# Patient Record
Sex: FEMALE | Race: BLACK OR AFRICAN AMERICAN | NOT HISPANIC OR LATINO | URBAN - METROPOLITAN AREA
[De-identification: names, ages, dates, MRNs, and addresses within clinical notes are randomized per-mention and may not be internally consistent; named-entity substitution may affect disease eponyms.]

---

## 2024-02-21 ENCOUNTER — EMERGENCY (EMERGENCY)
Facility: HOSPITAL | Age: 69
LOS: 1 days | Discharge: HOME | End: 2024-02-21
Attending: EMERGENCY MEDICINE | Admitting: EMERGENCY MEDICINE
Payer: COMMERCIAL

## 2024-02-21 VITALS
TEMPERATURE: 98 F | SYSTOLIC BLOOD PRESSURE: 151 MMHG | HEART RATE: 86 BPM | OXYGEN SATURATION: 100 % | RESPIRATION RATE: 16 BRPM | DIASTOLIC BLOOD PRESSURE: 74 MMHG

## 2024-02-21 VITALS
HEART RATE: 77 BPM | OXYGEN SATURATION: 100 % | RESPIRATION RATE: 16 BRPM | TEMPERATURE: 97 F | SYSTOLIC BLOOD PRESSURE: 171 MMHG | DIASTOLIC BLOOD PRESSURE: 83 MMHG

## 2024-02-21 LAB
BLD GP AB SCN SERPL QL: NEGATIVE — SIGNIFICANT CHANGE UP
HCT VFR BLD CALC: 39.1 % — SIGNIFICANT CHANGE UP (ref 34.5–45)
HGB BLD-MCNC: 12.5 G/DL — SIGNIFICANT CHANGE UP (ref 11.5–15.5)
RH IG SCN BLD-IMP: POSITIVE — SIGNIFICANT CHANGE UP

## 2024-02-21 PROCEDURE — 99284 EMERGENCY DEPT VISIT MOD MDM: CPT

## 2024-02-21 PROCEDURE — 93010 ELECTROCARDIOGRAM REPORT: CPT

## 2024-02-21 RX ORDER — TRANEXAMIC ACID 100 MG/ML
5 INJECTION, SOLUTION INTRAVENOUS ONCE
Refills: 0 | Status: DISCONTINUED | OUTPATIENT
Start: 2024-02-21 | End: 2024-02-24

## 2024-02-21 NOTE — ED ADULT NURSE NOTE - SUICIDE SCREENING QUESTION 3
Observation Goals:    -diagnostic tests and consults completed and resulted: Not met  -vital signs normal or at patient baseline: Not met  -tolerating oral intake to maintain hydration: Met  -safe disposition plan has been identified: Met  Nurse to notify provider when observation goals have been met and patient is ready for discharge.   No

## 2024-02-21 NOTE — ED PROVIDER NOTE - PHYSICAL EXAMINATION
Well-appearing speaking full sentences.  Bilateral nostrils are clear without any dried blood or active to bleeding or hemorrhage.  Oropharynx appears normal without any blood in the back of the throat.  Not tachycardic on cardiac exam and no murmurs rubs or gallops.  Lungs are clear otherwise.  Gait is steady and stable.

## 2024-02-21 NOTE — ED PROVIDER NOTE - OBJECTIVE STATEMENT
68-year-old female with type 2 diabetes not on insulin, hypertension, hyperlipidemia that presents with epistaxis.  Patient states that yesterday she had epistaxis prior to bed which stopped with pressure.  She went to sleep and woke up for work at 430 which is typical for patient.  Then she noticed that her left nostril was having epistaxis again.  She pinch the bridge of her nose but leaned her head back and called her friend to bring her to the hospital.  Blood pressure up to the hospital she was no longer have any epistaxis but does feel lightheaded but no palpitations, chest pain, shortness of breath, vision or hearing changes, paresthesias or weakness.  No traumas or falls.  No syncope.  Patient does not take any blood thinners or aspirin.  No recent trauma or URI symptoms otherwise.

## 2024-02-21 NOTE — ED ADULT NURSE NOTE - OBJECTIVE STATEMENT
A&Ox4. Past medical history of type 2 diabetes not on insulin, hypertension, hyperlipidemia. Presents with epistaxis.  Patient states that yesterday she had epistaxis prior to bed which stopped with pressure.  She went to sleep and woke up for work at 430 which is typical for patient.  Then she noticed that her left nostril was having epistaxis again.  She pinch the bridge of her nose but leaned her head back and called her friend to bring her to the hospital.  Blood pressure up to the hospital she was no longer have any epistaxis but does feel lightheaded but no palpitations, chest pain, shortness of breath, vision or hearing changes, paresthesias or weakness.  No traumas or falls.  No syncope.  Patient does not take any blood thinners or aspirin.  No recent trauma or URI symptoms otherwise. Respirations even and unlabored. Labs obtained by butterfly. Safety precautions in place.

## 2024-02-21 NOTE — ED PROVIDER NOTE - NSFOLLOWUPINSTRUCTIONS_ED_ALL_ED_FT
You were seen in the ED for EPISTAXIS also known as a nose bleed.     We obtained lab test to check for anemia in the emergency department and we did not find you severe anemia that required a blood transfusion.    We provided you with a medication called Afrin spray which helps primarily with nasal decongestant but also constrict the blood vessels in your nose to help prevent reoccurrence of nosebleed.    In the future if this happens again make sure you are pinching the soft part of your nose and holding it at least 15 minutes of pressure to help with stopping the bleeding.  Please lean forward when you do this.  Do not tilt your head backwards otherwise you will be swallowing a lot of blood order will be dripping in the back your throat which will make you nauseous and vomit.    You can also try to put ice over the nose to help constrict the blood vessels to decrease the bleeding.    It was determined that you have no life threatening injuries or condition that requires you to be admitted to the hospital or have any additional testing while in the ED.     Attached are your results from todays visit.     Please take these results to follow up with your primary care doctor and an EAR/NOSE/THROAT doctor so that they can determine if you need any additional testing or treatment as an outpatient.     If your symptoms worsen or change, you can return to the ED for further evaluation and care at that time.

## 2024-02-21 NOTE — ED PROVIDER NOTE - PATIENT PORTAL LINK FT
You can access the FollowMyHealth Patient Portal offered by [facility] by registering at the following website: [email or web address]. By joining [facility]’s FollowMyHealth portal, you will also be able to view your health information using other applications (apps) compatible with our system.

## 2024-02-21 NOTE — ED ADULT TRIAGE NOTE - CHIEF COMPLAINT QUOTE
Patient c/o nose bleed. Woke up with nosebleed at approx 430 am. NO bleeding at this time. Pt. endorses dizziness. Denies CP/SOB. PMH HTN, HLD, DM.

## 2024-02-21 NOTE — ED PROVIDER NOTE - ATTENDING CONTRIBUTION TO CARE
I, Dr Telly Smallwood wrote the initial note in its entirety and the resident/fellow only contributed to the progress note and disposition with which I agree.

## 2024-02-21 NOTE — ED PROVIDER NOTE - PROGRESS NOTE DETAILS
DEDRICK: pt was reassessed, no longer having epistaxis, labs shows H/H WNL. Pt aware, will discharge home, f/u with PMD and ENT as needed. Return precautions explained and understood. Nasal precautions explained and understood.

## 2024-02-21 NOTE — ED PROVIDER NOTE - CLINICAL SUMMARY MEDICAL DECISION MAKING FREE TEXT BOX
Patient not appropriate at this time, 8/15/19, to be seen for an occupational
therapy evaluation. Patient is drowsy per nursing. OT will follow up when
appropriate. Thank you for the referral.
 
Heidi Gillespie OTR/L 68-year-old female with type 2 diabetes not on insulin, hypertension, hyperlipidemia that presents with epistaxis prior to arrival that has since subsided and stopped with pressure at home.  Patient does report that she feels lightheaded although no syncopal episodes, no chest pain.  Vital signs noted to be hypertensive but likely secondary to reactive hypertension and anxiety of her condition.  Otherwise not tachycardic and has a benign exam.  At this time will obtain H&H to check for anemia and acute blood loss will provide Afrin and pulse monitoring.  If patient feels better and improved and no longer hemorrhaging versus epistaxis we will be able to discharge home with symptomatic treatment and follow-up with ENT and PMD outpatient.

## 2024-02-21 NOTE — ED PROVIDER NOTE - NSFOLLOWUPCLINICS_GEN_ALL_ED_FT
[facility] - ENT  Otolaryngology (ENT)  [address] [place] [address]  Phone: [phone]  Fax:

## 2024-04-09 ENCOUNTER — INPATIENT (INPATIENT)
Facility: HOSPITAL | Age: 69
LOS: 2 days | Discharge: HOME | End: 2024-04-12
Attending: INTERNAL MEDICINE | Admitting: INTERNAL MEDICINE
Payer: COMMERCIAL

## 2024-04-09 VITALS
TEMPERATURE: 98 F | SYSTOLIC BLOOD PRESSURE: 115 MMHG | RESPIRATION RATE: 19 BRPM | HEIGHT: 64 IN | DIASTOLIC BLOOD PRESSURE: 71 MMHG | WEIGHT: 169.98 LBS | OXYGEN SATURATION: 97 % | HEART RATE: 101 BPM

## 2024-04-09 DIAGNOSIS — R55 SYNCOPE AND COLLAPSE: ICD-10-CM

## 2024-04-09 DIAGNOSIS — N28.89 OTHER SPECIFIED DISORDERS OF KIDNEY AND URETER: ICD-10-CM

## 2024-04-09 DIAGNOSIS — E11.8 TYPE 2 DIABETES MELLITUS WITH UNSPECIFIED COMPLICATIONS: ICD-10-CM

## 2024-04-09 DIAGNOSIS — K92.2 GASTROINTESTINAL HEMORRHAGE, UNSPECIFIED: ICD-10-CM

## 2024-04-09 DIAGNOSIS — E78.5 HYPERLIPIDEMIA, UNSPECIFIED: ICD-10-CM

## 2024-04-09 DIAGNOSIS — I10 ESSENTIAL (PRIMARY) HYPERTENSION: ICD-10-CM

## 2024-04-09 LAB
ALBUMIN SERPL ELPH-MCNC: 2.7 G/DL — LOW (ref 3.3–5)
ALP SERPL-CCNC: 30 U/L — LOW (ref 40–120)
ALT FLD-CCNC: 9 U/L — LOW (ref 12–78)
ANION GAP SERPL CALC-SCNC: 4 MMOL/L — LOW (ref 5–17)
ANISOCYTOSIS BLD QL: SLIGHT — SIGNIFICANT CHANGE UP
APTT BLD: 25.8 SEC — SIGNIFICANT CHANGE UP (ref 24.5–35.6)
AST SERPL-CCNC: 13 U/L — LOW (ref 15–37)
BASOPHILS # BLD AUTO: 0.04 K/UL — SIGNIFICANT CHANGE UP (ref 0–0.2)
BASOPHILS NFR BLD AUTO: 0.3 % — SIGNIFICANT CHANGE UP (ref 0–2)
BILIRUB SERPL-MCNC: 0.2 MG/DL — SIGNIFICANT CHANGE UP (ref 0.2–1.2)
BLD GP AB SCN SERPL QL: SIGNIFICANT CHANGE UP
BUN SERPL-MCNC: 16 MG/DL — SIGNIFICANT CHANGE UP (ref 7–23)
CALCIUM SERPL-MCNC: 8.2 MG/DL — LOW (ref 8.5–10.1)
CHLORIDE SERPL-SCNC: 113 MMOL/L — HIGH (ref 96–108)
CO2 SERPL-SCNC: 26 MMOL/L — SIGNIFICANT CHANGE UP (ref 22–31)
CREAT SERPL-MCNC: 1.06 MG/DL — SIGNIFICANT CHANGE UP (ref 0.5–1.3)
EGFR: 57 ML/MIN/1.73M2 — LOW
EOSINOPHIL # BLD AUTO: 0.02 K/UL — SIGNIFICANT CHANGE UP (ref 0–0.5)
EOSINOPHIL NFR BLD AUTO: 0.2 % — SIGNIFICANT CHANGE UP (ref 0–6)
GLUCOSE BLDC GLUCOMTR-MCNC: 107 MG/DL — HIGH (ref 70–99)
GLUCOSE BLDC GLUCOMTR-MCNC: 148 MG/DL — HIGH (ref 70–99)
GLUCOSE BLDC GLUCOMTR-MCNC: 92 MG/DL — SIGNIFICANT CHANGE UP (ref 70–99)
GLUCOSE SERPL-MCNC: 238 MG/DL — HIGH (ref 70–99)
HCT VFR BLD CALC: 19.2 % — CRITICAL LOW (ref 34.5–45)
HCT VFR BLD CALC: 23.3 % — LOW (ref 34.5–45)
HCT VFR BLD CALC: 24.8 % — LOW (ref 34.5–45)
HGB BLD-MCNC: 6 G/DL — CRITICAL LOW (ref 11.5–15.5)
HGB BLD-MCNC: 7.6 G/DL — LOW (ref 11.5–15.5)
HGB BLD-MCNC: 8.1 G/DL — LOW (ref 11.5–15.5)
HYPOCHROMIA BLD QL: SLIGHT — SIGNIFICANT CHANGE UP
IMM GRANULOCYTES NFR BLD AUTO: 0.6 % — SIGNIFICANT CHANGE UP (ref 0–0.9)
INR BLD: 0.93 RATIO — SIGNIFICANT CHANGE UP (ref 0.85–1.18)
LIDOCAIN IGE QN: 19 U/L — SIGNIFICANT CHANGE UP (ref 13–75)
LYMPHOCYTES # BLD AUTO: 1.79 K/UL — SIGNIFICANT CHANGE UP (ref 1–3.3)
LYMPHOCYTES # BLD AUTO: 14.4 % — SIGNIFICANT CHANGE UP (ref 13–44)
MAGNESIUM SERPL-MCNC: 2 MG/DL — SIGNIFICANT CHANGE UP (ref 1.6–2.6)
MANUAL SMEAR VERIFICATION: SIGNIFICANT CHANGE UP
MCHC RBC-ENTMCNC: 24.6 PG — LOW (ref 27–34)
MCHC RBC-ENTMCNC: 25.1 PG — LOW (ref 27–34)
MCHC RBC-ENTMCNC: 25.4 PG — LOW (ref 27–34)
MCHC RBC-ENTMCNC: 31.3 G/DL — LOW (ref 32–36)
MCHC RBC-ENTMCNC: 32.6 G/DL — SIGNIFICANT CHANGE UP (ref 32–36)
MCHC RBC-ENTMCNC: 32.7 G/DL — SIGNIFICANT CHANGE UP (ref 32–36)
MCV RBC AUTO: 76.9 FL — LOW (ref 80–100)
MCV RBC AUTO: 77.7 FL — LOW (ref 80–100)
MCV RBC AUTO: 78.7 FL — LOW (ref 80–100)
MONOCYTES # BLD AUTO: 0.67 K/UL — SIGNIFICANT CHANGE UP (ref 0–0.9)
MONOCYTES NFR BLD AUTO: 5.4 % — SIGNIFICANT CHANGE UP (ref 2–14)
NEUTROPHILS # BLD AUTO: 9.87 K/UL — HIGH (ref 1.8–7.4)
NEUTROPHILS NFR BLD AUTO: 79.1 % — HIGH (ref 43–77)
NRBC # BLD: 0 /100 WBCS — SIGNIFICANT CHANGE UP (ref 0–0)
PLAT MORPH BLD: NORMAL — SIGNIFICANT CHANGE UP
PLATELET # BLD AUTO: 170 K/UL — SIGNIFICANT CHANGE UP (ref 150–400)
PLATELET # BLD AUTO: 182 K/UL — SIGNIFICANT CHANGE UP (ref 150–400)
PLATELET # BLD AUTO: 197 K/UL — SIGNIFICANT CHANGE UP (ref 150–400)
POIKILOCYTOSIS BLD QL AUTO: SLIGHT — SIGNIFICANT CHANGE UP
POLYCHROMASIA BLD QL SMEAR: SLIGHT — SIGNIFICANT CHANGE UP
POTASSIUM SERPL-MCNC: 4 MMOL/L — SIGNIFICANT CHANGE UP (ref 3.5–5.3)
POTASSIUM SERPL-SCNC: 4 MMOL/L — SIGNIFICANT CHANGE UP (ref 3.5–5.3)
PROT SERPL-MCNC: 5.5 GM/DL — LOW (ref 6–8.3)
PROTHROM AB SERPL-ACNC: 11.1 SEC — SIGNIFICANT CHANGE UP (ref 9.5–13)
RBC # BLD: 2.44 M/UL — LOW (ref 3.8–5.2)
RBC # BLD: 3.03 M/UL — LOW (ref 3.8–5.2)
RBC # BLD: 3.19 M/UL — LOW (ref 3.8–5.2)
RBC # FLD: 15.5 % — HIGH (ref 10.3–14.5)
RBC # FLD: 16.2 % — HIGH (ref 10.3–14.5)
RBC # FLD: 16.2 % — HIGH (ref 10.3–14.5)
RBC BLD AUTO: ABNORMAL
SODIUM SERPL-SCNC: 143 MMOL/L — SIGNIFICANT CHANGE UP (ref 135–145)
TROPONIN I, HIGH SENSITIVITY RESULT: 15.7 NG/L — SIGNIFICANT CHANGE UP
WBC # BLD: 12.45 K/UL — HIGH (ref 3.8–10.5)
WBC # BLD: 12.47 K/UL — HIGH (ref 3.8–10.5)
WBC # BLD: 12.88 K/UL — HIGH (ref 3.8–10.5)
WBC # FLD AUTO: 12.45 K/UL — HIGH (ref 3.8–10.5)
WBC # FLD AUTO: 12.47 K/UL — HIGH (ref 3.8–10.5)
WBC # FLD AUTO: 12.88 K/UL — HIGH (ref 3.8–10.5)

## 2024-04-09 PROCEDURE — 99285 EMERGENCY DEPT VISIT HI MDM: CPT

## 2024-04-09 PROCEDURE — 93010 ELECTROCARDIOGRAM REPORT: CPT

## 2024-04-09 PROCEDURE — 99223 1ST HOSP IP/OBS HIGH 75: CPT

## 2024-04-09 PROCEDURE — 71045 X-RAY EXAM CHEST 1 VIEW: CPT | Mod: 26

## 2024-04-09 PROCEDURE — 74174 CTA ABD&PLVS W/CONTRAST: CPT | Mod: 26,MC

## 2024-04-09 RX ORDER — DEXTROSE 50 % IN WATER 50 %
15 SYRINGE (ML) INTRAVENOUS ONCE
Refills: 0 | Status: DISCONTINUED | OUTPATIENT
Start: 2024-04-09 | End: 2024-04-12

## 2024-04-09 RX ORDER — INSULIN LISPRO 100/ML
VIAL (ML) SUBCUTANEOUS
Refills: 0 | Status: DISCONTINUED | OUTPATIENT
Start: 2024-04-09 | End: 2024-04-12

## 2024-04-09 RX ORDER — INSULIN LISPRO 100/ML
VIAL (ML) SUBCUTANEOUS AT BEDTIME
Refills: 0 | Status: DISCONTINUED | OUTPATIENT
Start: 2024-04-09 | End: 2024-04-12

## 2024-04-09 RX ORDER — PANTOPRAZOLE SODIUM 20 MG/1
40 TABLET, DELAYED RELEASE ORAL
Refills: 0 | Status: DISCONTINUED | OUTPATIENT
Start: 2024-04-09 | End: 2024-04-09

## 2024-04-09 RX ORDER — LANOLIN ALCOHOL/MO/W.PET/CERES
3 CREAM (GRAM) TOPICAL AT BEDTIME
Refills: 0 | Status: DISCONTINUED | OUTPATIENT
Start: 2024-04-09 | End: 2024-04-12

## 2024-04-09 RX ORDER — SODIUM CHLORIDE 9 MG/ML
1000 INJECTION, SOLUTION INTRAVENOUS
Refills: 0 | Status: DISCONTINUED | OUTPATIENT
Start: 2024-04-09 | End: 2024-04-12

## 2024-04-09 RX ORDER — GLUCAGON INJECTION, SOLUTION 0.5 MG/.1ML
1 INJECTION, SOLUTION SUBCUTANEOUS ONCE
Refills: 0 | Status: DISCONTINUED | OUTPATIENT
Start: 2024-04-09 | End: 2024-04-12

## 2024-04-09 RX ORDER — METRONIDAZOLE 500 MG
500 TABLET ORAL ONCE
Refills: 0 | Status: COMPLETED | OUTPATIENT
Start: 2024-04-09 | End: 2024-04-09

## 2024-04-09 RX ORDER — ACETAMINOPHEN 500 MG
650 TABLET ORAL EVERY 6 HOURS
Refills: 0 | Status: DISCONTINUED | OUTPATIENT
Start: 2024-04-09 | End: 2024-04-12

## 2024-04-09 RX ORDER — DEXTROSE 50 % IN WATER 50 %
12.5 SYRINGE (ML) INTRAVENOUS ONCE
Refills: 0 | Status: DISCONTINUED | OUTPATIENT
Start: 2024-04-09 | End: 2024-04-12

## 2024-04-09 RX ORDER — DEXTROSE 10 % IN WATER 10 %
125 INTRAVENOUS SOLUTION INTRAVENOUS ONCE
Refills: 0 | Status: DISCONTINUED | OUTPATIENT
Start: 2024-04-09 | End: 2024-04-12

## 2024-04-09 RX ORDER — DEXTROSE 50 % IN WATER 50 %
25 SYRINGE (ML) INTRAVENOUS ONCE
Refills: 0 | Status: DISCONTINUED | OUTPATIENT
Start: 2024-04-09 | End: 2024-04-12

## 2024-04-09 RX ORDER — LOSARTAN POTASSIUM 100 MG/1
25 TABLET, FILM COATED ORAL DAILY
Refills: 0 | Status: DISCONTINUED | OUTPATIENT
Start: 2024-04-09 | End: 2024-04-12

## 2024-04-09 RX ORDER — SIMVASTATIN 20 MG/1
20 TABLET, FILM COATED ORAL AT BEDTIME
Refills: 0 | Status: DISCONTINUED | OUTPATIENT
Start: 2024-04-09 | End: 2024-04-12

## 2024-04-09 RX ORDER — CEFTRIAXONE 500 MG/1
1000 INJECTION, POWDER, FOR SOLUTION INTRAMUSCULAR; INTRAVENOUS ONCE
Refills: 0 | Status: COMPLETED | OUTPATIENT
Start: 2024-04-09 | End: 2024-04-09

## 2024-04-09 RX ORDER — ONDANSETRON 8 MG/1
4 TABLET, FILM COATED ORAL EVERY 8 HOURS
Refills: 0 | Status: DISCONTINUED | OUTPATIENT
Start: 2024-04-09 | End: 2024-04-12

## 2024-04-09 RX ORDER — PANTOPRAZOLE SODIUM 20 MG/1
40 TABLET, DELAYED RELEASE ORAL EVERY 12 HOURS
Refills: 0 | Status: DISCONTINUED | OUTPATIENT
Start: 2024-04-09 | End: 2024-04-12

## 2024-04-09 RX ADMIN — PANTOPRAZOLE SODIUM 40 MILLIGRAM(S): 20 TABLET, DELAYED RELEASE ORAL at 12:51

## 2024-04-09 RX ADMIN — SODIUM CHLORIDE 125 MILLILITER(S): 9 INJECTION, SOLUTION INTRAVENOUS at 12:51

## 2024-04-09 RX ADMIN — CEFTRIAXONE 1000 MILLIGRAM(S): 500 INJECTION, POWDER, FOR SOLUTION INTRAMUSCULAR; INTRAVENOUS at 08:55

## 2024-04-09 RX ADMIN — CEFTRIAXONE 100 MILLIGRAM(S): 500 INJECTION, POWDER, FOR SOLUTION INTRAMUSCULAR; INTRAVENOUS at 08:25

## 2024-04-09 RX ADMIN — PANTOPRAZOLE SODIUM 40 MILLIGRAM(S): 20 TABLET, DELAYED RELEASE ORAL at 18:10

## 2024-04-09 RX ADMIN — SIMVASTATIN 20 MILLIGRAM(S): 20 TABLET, FILM COATED ORAL at 21:38

## 2024-04-09 RX ADMIN — Medication 100 MILLIGRAM(S): at 09:05

## 2024-04-09 RX ADMIN — SODIUM CHLORIDE 125 MILLILITER(S): 9 INJECTION, SOLUTION INTRAVENOUS at 21:38

## 2024-04-09 RX ADMIN — LOSARTAN POTASSIUM 25 MILLIGRAM(S): 100 TABLET, FILM COATED ORAL at 12:54

## 2024-04-09 NOTE — ED ADULT NURSE NOTE - NSFALLUNIVINTERV_ED_ALL_ED
Bed/Stretcher in lowest position, wheels locked, appropriate side rails in place/Call bell, personal items and telephone in reach/Instruct patient to call for assistance before getting out of bed/chair/stretcher/Non-slip footwear applied when patient is off stretcher/[place] to call system/Physically safe environment - no spills, clutter or unnecessary equipment/Purposeful proactive rounding/Room/bathroom lighting operational, light cord in reach

## 2024-04-09 NOTE — H&P ADULT - PROBLEM SELECTOR PLAN 1
dark red blood in stool  Hb 6  CTA  ( I personally review) with No active GI bleed. Colonic diverticulosis  S/P 2 unit of PRBC  likely diverticular bleed  monitor H/H, transfuse to keep Hb > 7 or if patient is symptomatic. Repeat Hb ordered  Clear liquid diet, oral PPI, IV fluid  Closely monitor hemodynamic status  GI consulted--> Dr Ferreira

## 2024-04-09 NOTE — ED ADULT TRIAGE NOTE - CHIEF COMPLAINT QUOTE
hx of HTN DM, pt c/o  large amount bloody stool x Sunday pt reports started Levaquin for sinus infection Sunday bloody stool began afterwards. In addition pt c/o weakness.  EMS witnessed pt syncopized 4 times in route to ED. No

## 2024-04-09 NOTE — ED PROVIDER NOTE - NS ED MD DISPO SPECIAL CONSIDERATION1
None Carac Counseling:  I discussed with the patient the risks of Carac including but not limited to erythema, scaling, itching, weeping, crusting, and pain.

## 2024-04-09 NOTE — ED ADULT NURSE NOTE - CHIEF COMPLAINT QUOTE
hx of HTN DM, pt c/o  large amount bloody stool x Sunday pt reports started Levaquin for sinus infection Sunday bloody stool began afterwards. In addition pt c/o weakness.  EMS witnessed pt syncopized 4 times in route to ED.

## 2024-04-09 NOTE — ED PROVIDER NOTE - PHYSICAL EXAMINATION
General: Well appearing female in no acute distress  HEENT: Normocephalic, atraumatic. Moist mucous membranes. Oropharynx clear. No lymphadenopathy.  Eyes: No scleral icterus. EOMI. JESSICA.  Neck:. Soft and supple. Full ROM without pain. No midline tenderness  Cardiac: Regular rate and regular rhythm. No murmurs, rubs, gallops. Peripheral pulses 2+ and symmetric. No LE edema.  Resp: Lungs CTAB. Speaking in full sentences. No wheezes, rales or rhonchi.  Abd: Soft, non-tender, non-distended. No guarding or rebound. No scars, masses, or lesions.  Back: Spine midline and non-tender. No CVA tenderness.    Skin: No rashes, abrasions, or lacerations.  Neuro: AO x 3. Moves all extremities symmetrically. Motor strength and sensation grossly intact.

## 2024-04-09 NOTE — ED ADULT NURSE REASSESSMENT NOTE - NS ED NURSE REASSESS COMMENT FT1
Pt no longer being transferred, being admitted instead. Blood bank contacted at 840 am. Blood ready. Ordered first unit of blood to be released. Initiated 920 am.

## 2024-04-09 NOTE — H&P ADULT - ASSESSMENT
68 year old female with h/o HTN, HLD, DM present to ED with complain of blood in stool. Patient reported 4 episode of dark red blood in stool on Sunday, and 2 episode today. While having bowel movement today, patient had one eposide of syncope. No abd pain, chest pain or SOB. No h/o GI bleed. No NSAID use and is not on blood thinner  Tachycardic upon arrival, BP stable, afebrile, sat well at RA. WBC 12.47, Hb 6, plt 197, Cr 1.06, hsTnT 15.7. CXR with no focal consolidation. CTA with No active GI bleed. Colonic diverticulosis. 2 right renal mass ( 4.9cm lower pole, 1.8cm upper pole) with features concerning for neoplasm. No metastases are evident within the abdomen or pelvis

## 2024-04-09 NOTE — ED PROVIDER NOTE - CARE PLAN
Principal Discharge DX:	Gastrointestinal bleed  Secondary Diagnosis:	Anemia requiring transfusions   1

## 2024-04-09 NOTE — ED ADULT NURSE NOTE - OBJECTIVE STATEMENT
hx of HTN DM, pt c/o  large amount bloody stool x Sunday pt reports started Levaquin for sinus infection Sunday bloody stool began afterwards. In addition pt c/o weakness.  EMS witnessed.

## 2024-04-09 NOTE — ED PROVIDER NOTE - CONSIDERATION OF ADMISSION OBSERVATION
will transfer for concern for lower GI bleed, PRBC transfusion given Consideration of Admission/Observation will admit for GI bleed, anemia PRBC transfusion given  considered transfer for further care but no evidence of acute bleed and GI available in day time for possible scope

## 2024-04-09 NOTE — PATIENT PROFILE ADULT - FALL HARM RISK - HARM RISK INTERVENTIONS

## 2024-04-09 NOTE — CONSULT NOTE ADULT - SUBJECTIVE AND OBJECTIVE BOX
Full consult dictated    Plan:  69 yo F with h/o HTN, HLD, DM admitted with several episodes of BRBPR since Sunday. ? syncope x 1. Hgb 6.0. No abd pain, chest pain or SOB. No h/o GI bleed. No ASA or NSAID use. CT shows Colonic diverticulosis. and R renal mass, Pt likel;y with diverticular bleed but will schedule for EGD and colonoscopy. Follow CBC; clear liquids; protonix    
Patient is a 68y old  Female who presents with a chief complaint of GI bleed (09 Apr 2024 14:15)      HPI:  68 year old female with h/o HTN, HLD, DM present to ED with complain of blood in stool. Patient reported 4 episode of dark red blood in stool on Sunday, and 2 episode today. While having bowel movement today, patient had one eposide of syncope. No abd pain, chest pain or SOB. No h/o GI bleed. No NSAID use and is not on blood thinner  Tachycardic upon arrival, BP stable, afebrile, sat well at RA. WBC 12.47, Hb 6, plt 197, Cr 1.06, hsTnT 15.7. CXR with no focal consolidation. CTA with No active GI bleed. Colonic diverticulosis. 2 right renal mass ( 4.9cm lower pole, 1.8cm upper pole) with features concerning for neoplasm. No metastases are evident within the abdomen or pelvis         interval HPI: 68 year old female with h/o HTN, HLD, DM present to ED with complain of blood in stool, admitted for GI bleed. On CT found to have incidental R renal lesions x 2. pt denies known hx of it prior to this hospitilization. endorses 25lbs weight loss over the past 3 years given less appetite after losing her . denies any abd pain, night sweats, dysuria, hematuria, increased urinary frequency.  Pshx c section 28 years ago  lap anish 22 years ago  2 remote laparoscopies for endometriosis > 28 years ago  polyp removal form cervix       SH: occasional alcohol gerald  FH: HTN, DM, Cancer (09 Apr 2024 12:36)      PAST MEDICAL & SURGICAL HISTORY:  HTN (hypertension)      DM (diabetes mellitus)          Review of Systems:  Negative except  as above in HPI    MEDICATIONS  (STANDING):  dextrose 10% Bolus 125 milliLiter(s) IV Bolus once  dextrose 5%. 1000 milliLiter(s) (100 mL/Hr) IV Continuous <Continuous>  dextrose 5%. 1000 milliLiter(s) (50 mL/Hr) IV Continuous <Continuous>  dextrose 50% Injectable 12.5 Gram(s) IV Push once  dextrose 50% Injectable 25 Gram(s) IV Push once  glucagon  Injectable 1 milliGRAM(s) IntraMuscular once  insulin lispro (ADMELOG) corrective regimen sliding scale   SubCutaneous at bedtime  insulin lispro (ADMELOG) corrective regimen sliding scale   SubCutaneous three times a day before meals  lactated ringers. 1000 milliLiter(s) (125 mL/Hr) IV Continuous <Continuous>  losartan 25 milliGRAM(s) Oral daily  pantoprazole  Injectable 40 milliGRAM(s) IV Push every 12 hours  simvastatin 20 milliGRAM(s) Oral at bedtime    MEDICATIONS  (PRN):  acetaminophen     Tablet .. 650 milliGRAM(s) Oral every 6 hours PRN Mild Pain (1 - 3), Moderate Pain (4 - 6)  dextrose Oral Gel 15 Gram(s) Oral once PRN Blood Glucose LESS THAN 70 milliGRAM(s)/deciliter  melatonin 3 milliGRAM(s) Oral at bedtime PRN Insomnia  ondansetron Injectable 4 milliGRAM(s) IV Push every 8 hours PRN Nausea and/or Vomiting      Allergies    No Known Allergies    Intolerances        SOCIAL HISTORY           FAMILY HISTORY:      Vital Signs Last 24 Hrs  T(C): 37.2 (09 Apr 2024 16:53), Max: 37.2 (09 Apr 2024 15:30)  T(F): 98.9 (09 Apr 2024 16:53), Max: 98.9 (09 Apr 2024 15:30)  HR: 99 (09 Apr 2024 16:53) (98 - 107)  BP: 120/67 (09 Apr 2024 16:53) (115/71 - 137/60)  BP(mean): --  RR: 18 (09 Apr 2024 16:53) (14 - 24)  SpO2: 98% (09 Apr 2024 16:53) (97% - 100%)    Parameters below as of 09 Apr 2024 16:53  Patient On (Oxygen Delivery Method): room air        Physical Exam:  General:  Appears stated age, well-groomed, well-nourished, no distress  Eyes: EOMI, conjunctiva clear  HENT:  WNL, no JVD  Chest: no respiratory distress, no accessory muscle use  Abdomen:  soft nondistended, nontender  : neg CVAT bilateral   Extremities:  no pedal edema or calf tenderness noted  Neuro/Psych:  Alert, oriented to time, place and person     LABS:                        7.6    12.45 )-----------( 170      ( 09 Apr 2024 17:31 )             23.3     04-09    143  |  113<H>  |  16  ----------------------------<  238<H>  4.0   |  26  |  1.06    Ca    8.2<L>      09 Apr 2024 04:36  Mg     2.0     04-09    TPro  5.5<L>  /  Alb  2.7<L>  /  TBili  0.2  /  DBili  x   /  AST  13<L>  /  ALT  9<L>  /  AlkPhos  30<L>  04-09    PT/INR - ( 09 Apr 2024 04:36 )   PT: 11.1 sec;   INR: 0.93 ratio         PTT - ( 09 Apr 2024 04:36 )  PTT:25.8 sec  Urinalysis Basic - ( 09 Apr 2024 04:36 )    Color: x / Appearance: x / SG: x / pH: x  Gluc: 238 mg/dL / Ketone: x  / Bili: x / Urobili: x   Blood: x / Protein: x / Nitrite: x   Leuk Esterase: x / RBC: x / WBC x   Sq Epi: x / Non Sq Epi: x / Bacteria: x          RADIOLOGY & ADDITIONAL STUDIES:    < from: CT Angio Abdomen and Pelvis w/ IV Cont (04.09.24 @ 06:44) >  KIDNEYS/URETERS: 4.9 x 3.3 x 4.2 cm TRV X AP X CC enhancing lobular mass   extends medially and inferiorly off the lower pole of the right kidney. A   second 1.8 cm rounded mass extends anteriorly off the upper pole cortex   of the right kidney, with a cystic component anteriorly but with a thick   rind of enhancing tissue posteriorly. The left kidney contains a few   simple cysts. No hydronephrosis or renal stone.    < end of copied text >        A/P:  68 year old female with h/o HTN, HLD, DM admitted with GI bleed found to have incidental right renal masses as described above.   -ordered renal US  -pt will likely need eventual surgical intervention, can be done as an outpt  -continue care per primary team and GI  -case discussed with Dr Rivers

## 2024-04-09 NOTE — ED ADULT NURSE NOTE - ED STAT RN HANDOFF DETAILS
Handoff received from Inocente Jonas. Pt is stable at this time. to be transferred Handoff received from Flex ZELDA Jonas. Pt is stable at this time. to be transferred. blood not yet ready from blood bank

## 2024-04-09 NOTE — ED PROVIDER NOTE - CLINICAL SUMMARY MEDICAL DECISION MAKING FREE TEXT BOX
elderly female presenting for lower GI bleed requiring transfusion    syncopal episode after recent BRBPR  GI consulted via transfer center  H/H 6/19  CTA elderly female presenting for lower GI bleed requiring transfusion    syncopal episode after recent BRBPR  GI consulted via transfer center. pt hemodynamically stable w/o need for emergent transfer  H/H 6/19  CTA revealing possible colitis, no active bleed  transfusion  abx  tele admit, pending GI

## 2024-04-09 NOTE — ED PROVIDER NOTE - OBJECTIVE STATEMENT
68 F pmh HTN, DM presenting to the ED for bloody stools. Pt states that she experienced BRBPR 2 days ago a/w lightheadedness and she experienced another episode today

## 2024-04-09 NOTE — H&P ADULT - PROBLEM SELECTOR PLAN 3
CT abd/pelvis  ( I personally review) with 2 right renal mass ( 4.9cm lower pole, 1.8cm upper pole) with features concerning for neoplasm. No metastases are evident within the abdomen or pelvis  20 lb weight loss within last year  Urology consulted

## 2024-04-09 NOTE — ED ADULT NURSE REASSESSMENT NOTE - NS ED NURSE REASSESS COMMENT FT1
accompanied pt to 1C with first unit of blood running. Second unit of blood started and verified with ZELDA Tavarez of 1C on 1C. Endorsed to ZELDA RAMIREZ

## 2024-04-09 NOTE — PATIENT PROFILE ADULT - VISION (WITH CORRECTIVE LENSES IF THE PATIENT USUALLY WEARS THEM):
PREVNAR-20 PNEUMONIA IMMUNIZATION AND TETANUS/PERTUSSIS IMMUNIZATIONS ARE ADMINISTERED TO YOU TODAY    2.  SHINGRIX IMMUNIZATION SCRIPT PRINTED TO TAKE TO THE PHARMACY TO GET THAT    3.  COLONSOCOPY REFERRAL IS MADE FOR YOU    4.  TAMSULOSIN 0.4 MG DAILY FOR SYMPTOMS OF PROSTATE ENLARGEMENT, PLEASE USE CAUTIOUSLY WITH SILDENAFIL    5.  REGULAR DENTAL AND EYE CHECKS AS YOU ARE DOING    6.  A1C TO BE DONE WITH BLOOD TEST WILL GUAGE HOW WELL YOU ARE DOING WITH DIABETES    7.  6 MONTH FOLLOW UP FOR DIABETES IS RECOMMENDED    8.  REGULAR DIET, EXERCISE, AND WEIGHT LOSS IS RECOMMENDED FOR YOU AS FOR ALL  
Normal vision: sees adequately in most situations; can see medication labels, newsprint

## 2024-04-09 NOTE — H&P ADULT - NSHPPHYSICALEXAM_GEN_ALL_CORE
CONSTITUTIONAL: alert and cooperative, no acute distress  EYES: PERRL, no scleral icterus  ENT: Mucosa moist, tongue normal  NECK: Neck supple, trachea midline, non-tender  CARDIAC: Normal S1 and S2. Regular rate and rhythms. No Pedal edema. Peripheral pulses intact  LUNGS: Equal air entry both lungs. No rales, rhonchi, wheezing. Normal respiratory effort.   ABDOMEN: Soft, nondistended, nontender. No guarding or rebound tenderness. No hepatomegaly or splenomegaly. Bowel sound normal.  MUSCULOSKELETAL: Normocephalic, atraumatic. No significant deformity or joint abnormality  NEUROLOGICAL: No gross motor or sensory deficits  SKIN: no lesions or eruptions. Normal turgor  PSYCHIATRIC: A&O x 3, appropriate mood and affect

## 2024-04-09 NOTE — H&P ADULT - HISTORY OF PRESENT ILLNESS
68 year old female with h/o HTN, HLD, DM present to ED with complain of blood in stool. Patient reported 4 episode of dark red blood in stool on Sunday, and 2 episode today. While having bowel movement today, patient had one eposide of syncope. No abd pain, chest pain or SOB. No h/o GI bleed. No NSAID use and is not on blood thinner  Tachycardic upon arrival, BP stable, afebrile, sat well at RA. WBC 12.47, Hb 6, plt 197, Cr 1.06, hsTnT 15.7. CXR with no focal consolidation. CTA with No active GI bleed. Colonic diverticulosis. 2 right renal mass ( 4.9cm lower pole, 1.8cm upper pole) with features concerning for neoplasm. No metastases are evident within the abdomen or pelvis    SH: occasional alcohol gerald  FH: HTN, DM, Cancer

## 2024-04-09 NOTE — PATIENT PROFILE ADULT - FUNCTIONAL ASSESSMENT - BASIC MOBILITY 2.
IR Procedure RN's Note:    Portal pressure gradient reading done by Dr. Jones; RIGHT femoral vein access site; pt assessed upon arrival, pt A/Ox4, aware of the procedure.    Portal gradient readings:  Hepatica vein #1 Free = 15mmHg  Hepatic vein #1 wedged = 25 mmHg  Hepatic vein #2 Free = 16 mmHg  Hepatic vein #2 wedged = 25mmHg  Hepatic vein #3 wedged = 26 mmHg  Hepatic vein #3 free = 16 mmHg  IVC = 4 mmHg  RA = 7 mmHg      Pt appears comfortable throughout the procedure with no signs of distress or discomfort; ETCO2 with the use of the anesthesia machine used and baseline ETCO2 prior to start of case is 11mmHg with no consistent waveform, tubing and machine troubleshooted prior to start of case, tubing also changed, ETCO2 throughout the case ranged between 6-11 mmHg with O2Sat of above 95% throughout the procedure, pt assessed consistently throughout the procedure; manual pressure held over access site for 10 min; access site CDI, soft, with so signs of bleeding or hematoma, gauze and tegaderm for dressing; telephone report given to Tabby VANEGAS pt lethargic post procedure and during transport; pt and family transported back to [facility] for recovery by this RN.    4 = No assist / stand by assistance

## 2024-04-10 ENCOUNTER — RESULT REVIEW (OUTPATIENT)
Age: 69
End: 2024-04-10

## 2024-04-10 LAB
A1C WITH ESTIMATED AVERAGE GLUCOSE RESULT: 5.9 % — HIGH (ref 4–5.6)
ALBUMIN SERPL ELPH-MCNC: 2.6 G/DL — LOW (ref 3.3–5)
ALP SERPL-CCNC: 26 U/L — LOW (ref 40–120)
ALT FLD-CCNC: 11 U/L — LOW (ref 12–78)
ANION GAP SERPL CALC-SCNC: 5 MMOL/L — SIGNIFICANT CHANGE UP (ref 5–17)
AST SERPL-CCNC: 14 U/L — LOW (ref 15–37)
BILIRUB SERPL-MCNC: 0.5 MG/DL — SIGNIFICANT CHANGE UP (ref 0.2–1.2)
BUN SERPL-MCNC: 5 MG/DL — LOW (ref 7–23)
CALCIUM SERPL-MCNC: 8.2 MG/DL — LOW (ref 8.5–10.1)
CHLORIDE SERPL-SCNC: 113 MMOL/L — HIGH (ref 96–108)
CHOLEST SERPL-MCNC: 108 MG/DL — SIGNIFICANT CHANGE UP
CO2 SERPL-SCNC: 27 MMOL/L — SIGNIFICANT CHANGE UP (ref 22–31)
CREAT SERPL-MCNC: 0.84 MG/DL — SIGNIFICANT CHANGE UP (ref 0.5–1.3)
EGFR: 75 ML/MIN/1.73M2 — SIGNIFICANT CHANGE UP
ESTIMATED AVERAGE GLUCOSE: 123 MG/DL — HIGH (ref 68–114)
GLUCOSE BLDC GLUCOMTR-MCNC: 121 MG/DL — HIGH (ref 70–99)
GLUCOSE BLDC GLUCOMTR-MCNC: 122 MG/DL — HIGH (ref 70–99)
GLUCOSE BLDC GLUCOMTR-MCNC: 131 MG/DL — HIGH (ref 70–99)
GLUCOSE BLDC GLUCOMTR-MCNC: 92 MG/DL — SIGNIFICANT CHANGE UP (ref 70–99)
GLUCOSE SERPL-MCNC: 111 MG/DL — HIGH (ref 70–99)
HCT VFR BLD CALC: 23.6 % — LOW (ref 34.5–45)
HCV AB S/CO SERPL IA: 0.07 S/CO — SIGNIFICANT CHANGE UP (ref 0–0.99)
HCV AB SERPL-IMP: SIGNIFICANT CHANGE UP
HDLC SERPL-MCNC: 38 MG/DL — LOW
HGB BLD-MCNC: 7.5 G/DL — LOW (ref 11.5–15.5)
LIPID PNL WITH DIRECT LDL SERPL: 47 MG/DL — SIGNIFICANT CHANGE UP
MAGNESIUM SERPL-MCNC: 2.1 MG/DL — SIGNIFICANT CHANGE UP (ref 1.6–2.6)
MCHC RBC-ENTMCNC: 24.8 PG — LOW (ref 27–34)
MCHC RBC-ENTMCNC: 31.8 G/DL — LOW (ref 32–36)
MCV RBC AUTO: 78.1 FL — LOW (ref 80–100)
NON HDL CHOLESTEROL: 70 MG/DL — SIGNIFICANT CHANGE UP
NRBC # BLD: 0 /100 WBCS — SIGNIFICANT CHANGE UP (ref 0–0)
PHOSPHATE SERPL-MCNC: 3.4 MG/DL — SIGNIFICANT CHANGE UP (ref 2.5–4.5)
PLATELET # BLD AUTO: 169 K/UL — SIGNIFICANT CHANGE UP (ref 150–400)
POTASSIUM SERPL-MCNC: 3.6 MMOL/L — SIGNIFICANT CHANGE UP (ref 3.5–5.3)
POTASSIUM SERPL-SCNC: 3.6 MMOL/L — SIGNIFICANT CHANGE UP (ref 3.5–5.3)
PROT SERPL-MCNC: 5.3 GM/DL — LOW (ref 6–8.3)
RBC # BLD: 3.02 M/UL — LOW (ref 3.8–5.2)
RBC # FLD: 16.7 % — HIGH (ref 10.3–14.5)
SODIUM SERPL-SCNC: 145 MMOL/L — SIGNIFICANT CHANGE UP (ref 135–145)
TRIGL SERPL-MCNC: 126 MG/DL — SIGNIFICANT CHANGE UP
WBC # BLD: 8.93 K/UL — SIGNIFICANT CHANGE UP (ref 3.8–10.5)
WBC # FLD AUTO: 8.93 K/UL — SIGNIFICANT CHANGE UP (ref 3.8–10.5)

## 2024-04-10 PROCEDURE — 99221 1ST HOSP IP/OBS SF/LOW 40: CPT

## 2024-04-10 PROCEDURE — 76775 US EXAM ABDO BACK WALL LIM: CPT | Mod: 26

## 2024-04-10 PROCEDURE — 99232 SBSQ HOSP IP/OBS MODERATE 35: CPT

## 2024-04-10 PROCEDURE — 93306 TTE W/DOPPLER COMPLETE: CPT | Mod: 26

## 2024-04-10 RX ORDER — SOD SULF/SODIUM/NAHCO3/KCL/PEG
4000 SOLUTION, RECONSTITUTED, ORAL ORAL ONCE
Refills: 0 | Status: COMPLETED | OUTPATIENT
Start: 2024-04-10 | End: 2024-04-10

## 2024-04-10 RX ADMIN — Medication 650 MILLIGRAM(S): at 06:37

## 2024-04-10 RX ADMIN — SODIUM CHLORIDE 125 MILLILITER(S): 9 INJECTION, SOLUTION INTRAVENOUS at 08:06

## 2024-04-10 RX ADMIN — SIMVASTATIN 20 MILLIGRAM(S): 20 TABLET, FILM COATED ORAL at 21:23

## 2024-04-10 RX ADMIN — PANTOPRAZOLE SODIUM 40 MILLIGRAM(S): 20 TABLET, DELAYED RELEASE ORAL at 06:38

## 2024-04-10 RX ADMIN — Medication 4000 MILLILITER(S): at 13:56

## 2024-04-10 RX ADMIN — PANTOPRAZOLE SODIUM 40 MILLIGRAM(S): 20 TABLET, DELAYED RELEASE ORAL at 17:43

## 2024-04-10 RX ADMIN — Medication 10 MILLIGRAM(S): at 17:43

## 2024-04-10 NOTE — DIETITIAN INITIAL EVALUATION ADULT - ENERGY INTAKE
Pt on clears x 2 days due to possibly diverticular bleed. Pending EGD/colonoscopy. Pt reports tolerating clears at this time.

## 2024-04-10 NOTE — DIETITIAN INITIAL EVALUATION ADULT - ORAL INTAKE PTA/DIET HISTORY
Pt reports baseline appetite PTA. States she eats when she feels hungry. Reports no diet restrictions other than allergy to strawberries PTA. Pt reports baseline appetite PTA. States she eats when she feels hungry. Reports no diet restrictions other than "deathly" allergy to strawberries PTA.

## 2024-04-10 NOTE — DIETITIAN INITIAL EVALUATION ADULT - OBTAIN WEEKLY WEIGHT
"Goal Outcome Evaluation:  Plan of Care Reviewed With: patient           Outcome Summary: 79 yo female seen s/p R reverse TSA on 8/31/21. Pt in abduction sling for RUE. Pt reports she normally uses a rw \"most of the time\" and amb in home w/o it \"sometimes.\" She does not drive. Uses a scooter if in a store. Lives w/  and disabled adult dtr, who also uses a rw. Uses lift chair at home. Today, pt is alert and oriented x 4. She comes to sit w/ mod assist of 1. She requires mod assist to come to stand and to transfer on/off bsc. She requires mod assist to come to stand. Attempted use of hemiwx, but pt is so weak in LUE that she needed assist in lifting/advancing the hemiwx. She amb 15 ft w/ mod assist and unsteady gait. She scores only 5/28 on the Tinetti balance scale, indicating high risk for falls. Pt is unsafe to go home w/ family at d/c. Will need IP rehab at d/c. Will follow.  " yes

## 2024-04-10 NOTE — PROGRESS NOTE ADULT - SUBJECTIVE AND OBJECTIVE BOX
still with slow drop in H/H  scheduled for EGD/Colon tomorrow      MEDICATIONS  (STANDING):  dextrose 10% Bolus 125 milliLiter(s) IV Bolus once  dextrose 5%. 1000 milliLiter(s) (100 mL/Hr) IV Continuous <Continuous>  dextrose 5%. 1000 milliLiter(s) (50 mL/Hr) IV Continuous <Continuous>  dextrose 50% Injectable 12.5 Gram(s) IV Push once  dextrose 50% Injectable 25 Gram(s) IV Push once  glucagon  Injectable 1 milliGRAM(s) IntraMuscular once  insulin lispro (ADMELOG) corrective regimen sliding scale   SubCutaneous at bedtime  insulin lispro (ADMELOG) corrective regimen sliding scale   SubCutaneous three times a day before meals  lactated ringers. 1000 milliLiter(s) (125 mL/Hr) IV Continuous <Continuous>  losartan 25 milliGRAM(s) Oral daily  pantoprazole  Injectable 40 milliGRAM(s) IV Push every 12 hours  simvastatin 20 milliGRAM(s) Oral at bedtime    MEDICATIONS  (PRN):  acetaminophen     Tablet .. 650 milliGRAM(s) Oral every 6 hours PRN Mild Pain (1 - 3), Moderate Pain (4 - 6)  dextrose Oral Gel 15 Gram(s) Oral once PRN Blood Glucose LESS THAN 70 milliGRAM(s)/deciliter  melatonin 3 milliGRAM(s) Oral at bedtime PRN Insomnia  ondansetron Injectable 4 milliGRAM(s) IV Push every 8 hours PRN Nausea and/or Vomiting      Allergies    No Known Allergies    Intolerances        Vital Signs Last 24 Hrs  T(C): 36.9 (10 Apr 2024 10:52), Max: 37.2 (09 Apr 2024 15:30)  T(F): 98.5 (10 Apr 2024 10:52), Max: 98.9 (09 Apr 2024 15:30)  HR: 90 (10 Apr 2024 10:52) (90 - 107)  BP: 127/72 (10 Apr 2024 10:52) (112/67 - 137/60)  BP(mean): --  RR: 18 (10 Apr 2024 10:52) (17 - 18)  SpO2: 96% (10 Apr 2024 10:52) (96% - 100%)    Parameters below as of 10 Apr 2024 10:52  Patient On (Oxygen Delivery Method): room air        PHYSICAL EXAM:  General: NAD.  CVS: S1, S2  Chest: air entry bilaterally present  Abd: BS present, soft, non-tender      LABS:                        7.5    8.93  )-----------( 169      ( 10 Apr 2024 06:30 )             23.6     04-10    145  |  113<H>  |  5<L>  ----------------------------<  111<H>  3.6   |  27  |  0.84    Ca    8.2<L>      10 Apr 2024 06:30  Phos  3.4     04-10  Mg     2.1     04-10    TPro  5.3<L>  /  Alb  2.6<L>  /  TBili  0.5  /  DBili  x   /  AST  14<L>  /  ALT  11<L>  /  AlkPhos  26<L>  04-10    PT/INR - ( 09 Apr 2024 04:36 )   PT: 11.1 sec;   INR: 0.93 ratio         PTT - ( 09 Apr 2024 04:36 )  PTT:25.8 sec    transfuse 1u prbc's  colon prep ordered  CBC in AM  continue protonix

## 2024-04-10 NOTE — PROGRESS NOTE ADULT - SUBJECTIVE AND OBJECTIVE BOX
Patient seen and examined bedside resting comfortably.  No complaints offered.   Voiding without difficulty.  Denies hematuria and dysuria.  Denies nausea and vomiting. Tolerating diet.  Denies chest pain, dyspnea, cough.    T(F): 98.8 (04-10-24 @ 16:08), Max: 98.9 (04-09-24 @ 16:53)  HR: 81 (04-10-24 @ 16:08) (81 - 99)  BP: 134/69 (04-10-24 @ 16:08) (112/67 - 151/72)  RR: 17 (04-10-24 @ 16:08) (15 - 18)  SpO2: 98% (04-10-24 @ 16:08) (96% - 100%)  Wt(kg): --  CAPILLARY BLOOD GLUCOSE      POCT Blood Glucose.: 131 mg/dL (10 Apr 2024 11:19)  POCT Blood Glucose.: 121 mg/dL (10 Apr 2024 08:00)  POCT Blood Glucose.: 107 mg/dL (09 Apr 2024 21:36)      PHYSICAL EXAM:  General: NAD, alert and awake  HEENT: NCAT, EOMI, conjunctiva clear  Chest: nonlabored respirations, good inspiratory effort  Abdomen: soft, NTND.   Extremities: no pedal edema or calf tenderness noted   : No CVA or SP tenderness.     LABS:                        7.5    8.93  )-----------( 169      ( 10 Apr 2024 06:30 )             23.6   04-10    145  |  113<H>  |  5<L>  ----------------------------<  111<H>  3.6   |  27  |  0.84    Ca    8.2<L>      10 Apr 2024 06:30  Phos  3.4     04-10  Mg     2.1     04-10    TPro  5.3<L>  /  Alb  2.6<L>  /  TBili  0.5  /  DBili  x   /  AST  14<L>  /  ALT  11<L>  /  AlkPhos  26<L>  04-10  PT/INR - ( 09 Apr 2024 04:36 )   PT: 11.1 sec;   INR: 0.93 ratio         PTT - ( 09 Apr 2024 04:36 )  PTT:25.8 sec  I&O's Detail    09 Apr 2024 07:01  -  10 Apr 2024 07:00  --------------------------------------------------------  IN:    Lactated Ringers: 625 mL    Oral Fluid: 360 mL  Total IN: 985 mL    OUT:  Total OUT: 0 mL    Total NET: 985 mL      10 Apr 2024 07:01  -  10 Apr 2024 16:24  --------------------------------------------------------  IN:    Oral Fluid: 840 mL  Total IN: 840 mL    OUT:  Total OUT: 0 mL    Total NET: 840 mL

## 2024-04-10 NOTE — PROGRESS NOTE ADULT - SUBJECTIVE AND OBJECTIVE BOX
Patient is a 68y old  Female who presents with a chief complaint of GI bleed (10 Apr 2024 16:19)      INTERVAL HPI/OVERNIGHT EVENTS:    MEDICATIONS  (STANDING):  dextrose 10% Bolus 125 milliLiter(s) IV Bolus once  dextrose 5%. 1000 milliLiter(s) (100 mL/Hr) IV Continuous <Continuous>  dextrose 5%. 1000 milliLiter(s) (50 mL/Hr) IV Continuous <Continuous>  dextrose 50% Injectable 12.5 Gram(s) IV Push once  dextrose 50% Injectable 25 Gram(s) IV Push once  glucagon  Injectable 1 milliGRAM(s) IntraMuscular once  insulin lispro (ADMELOG) corrective regimen sliding scale   SubCutaneous at bedtime  insulin lispro (ADMELOG) corrective regimen sliding scale   SubCutaneous three times a day before meals  lactated ringers. 1000 milliLiter(s) (125 mL/Hr) IV Continuous <Continuous>  losartan 25 milliGRAM(s) Oral daily  pantoprazole  Injectable 40 milliGRAM(s) IV Push every 12 hours  simvastatin 20 milliGRAM(s) Oral at bedtime    MEDICATIONS  (PRN):  acetaminophen     Tablet .. 650 milliGRAM(s) Oral every 6 hours PRN Mild Pain (1 - 3), Moderate Pain (4 - 6)  dextrose Oral Gel 15 Gram(s) Oral once PRN Blood Glucose LESS THAN 70 milliGRAM(s)/deciliter  melatonin 3 milliGRAM(s) Oral at bedtime PRN Insomnia  ondansetron Injectable 4 milliGRAM(s) IV Push every 8 hours PRN Nausea and/or Vomiting      Allergies    strawberry (Anaphylaxis)  Drug Allergies Not Recorded    Intolerances        Vital Signs Last 24 Hrs  T(C): 37.1 (10 Apr 2024 16:08), Max: 37.1 (09 Apr 2024 23:43)  T(F): 98.8 (10 Apr 2024 16:08), Max: 98.8 (10 Apr 2024 16:08)  HR: 81 (10 Apr 2024 16:08) (81 - 99)  BP: 134/69 (10 Apr 2024 16:08) (112/67 - 151/72)  BP(mean): --  RR: 17 (10 Apr 2024 16:08) (15 - 18)  SpO2: 98% (10 Apr 2024 16:08) (96% - 100%)    Parameters below as of 10 Apr 2024 16:08  Patient On (Oxygen Delivery Method): room air        PHYSICAL EXAM:  GENERAL: NAD, well-groomed, well-developed  HEAD:  Atraumatic, Normocephalic  EYES: EOMI, PERRLA, conjunctiva and sclera clear  ENMT: No tonsillar erythema, exudates, or enlargement; Moist mucous membranes, Good dentition, No lesions  NECK: Supple, No JVD, Normal thyroid  NERVOUS SYSTEM:  Alert & Oriented X3, Good concentration; Motor Strength 5/5 B/L upper and lower extremities; DTRs 2+ intact and symmetric  CHEST/LUNG: Clear to auscultation bilaterally; No rales, rhonchi, wheezing, or rubs  HEART: Regular rate and rhythm; No murmurs, rubs, or gallops  ABDOMEN: Soft, Nontender, Nondistended; Bowel sounds present  EXTREMITIES:  2+ Peripheral Pulses, No clubbing, cyanosis, or edema  LYMPH: No lymphadenopathy noted  SKIN: No rashes or lesions    LABS:                        7.5    8.93  )-----------( 169      ( 10 Apr 2024 06:30 )             23.6     04-10    145  |  113<H>  |  5<L>  ----------------------------<  111<H>  3.6   |  27  |  0.84    Ca    8.2<L>      10 Apr 2024 06:30  Phos  3.4     04-10  Mg     2.1     04-10    TPro  5.3<L>  /  Alb  2.6<L>  /  TBili  0.5  /  DBili  x   /  AST  14<L>  /  ALT  11<L>  /  AlkPhos  26<L>  04-10    PT/INR - ( 09 Apr 2024 04:36 )   PT: 11.1 sec;   INR: 0.93 ratio         PTT - ( 09 Apr 2024 04:36 )  PTT:25.8 sec  Urinalysis Basic - ( 10 Apr 2024 06:30 )    Color: x / Appearance: x / SG: x / pH: x  Gluc: 111 mg/dL / Ketone: x  / Bili: x / Urobili: x   Blood: x / Protein: x / Nitrite: x   Leuk Esterase: x / RBC: x / WBC x   Sq Epi: x / Non Sq Epi: x / Bacteria: x      CAPILLARY BLOOD GLUCOSE      POCT Blood Glucose.: 92 mg/dL (10 Apr 2024 16:47)  POCT Blood Glucose.: 131 mg/dL (10 Apr 2024 11:19)  POCT Blood Glucose.: 121 mg/dL (10 Apr 2024 08:00)  POCT Blood Glucose.: 107 mg/dL (09 Apr 2024 21:36)      RADIOLOGY & ADDITIONAL TESTS:    04-09-24 @ 07:01  -  04-10-24 @ 07:00  --------------------------------------------------------  IN:    Lactated Ringers: 625 mL    Oral Fluid: 360 mL  Total IN: 985 mL    OUT:  Total OUT: 0 mL    Total NET: 985 mL      04-10-24 @ 07:01  -  04-10-24 @ 18:51  --------------------------------------------------------  IN:    Oral Fluid: 840 mL  Total IN: 840 mL    OUT:  Total OUT: 0 mL    Total NET: 840 mL       68 year old female with history of HTN, HLD, DM II presented w/ blood in stool and had one episode of syncope during her BM.  CTA on admission showed no active GI bleed, but there was colonic diverticulosis and 2 right renal masses. She is lying in bed in NAD.    MEDICATIONS  (STANDING):  dextrose 10% Bolus 125 milliLiter(s) IV Bolus once  dextrose 5%. 1000 milliLiter(s) (100 mL/Hr) IV Continuous <Continuous>  dextrose 5%. 1000 milliLiter(s) (50 mL/Hr) IV Continuous <Continuous>  dextrose 50% Injectable 12.5 Gram(s) IV Push once  dextrose 50% Injectable 25 Gram(s) IV Push once  glucagon  Injectable 1 milliGRAM(s) IntraMuscular once  insulin lispro (ADMELOG) corrective regimen sliding scale   SubCutaneous at bedtime  insulin lispro (ADMELOG) corrective regimen sliding scale   SubCutaneous three times a day before meals  lactated ringers. 1000 milliLiter(s) (125 mL/Hr) IV Continuous <Continuous>  losartan 25 milliGRAM(s) Oral daily  pantoprazole  Injectable 40 milliGRAM(s) IV Push every 12 hours  simvastatin 20 milliGRAM(s) Oral at bedtime    MEDICATIONS  (PRN):  acetaminophen     Tablet .. 650 milliGRAM(s) Oral every 6 hours PRN Mild Pain (1 - 3), Moderate Pain (4 - 6)  dextrose Oral Gel 15 Gram(s) Oral once PRN Blood Glucose LESS THAN 70 milliGRAM(s)/deciliter  melatonin 3 milliGRAM(s) Oral at bedtime PRN Insomnia  ondansetron Injectable 4 milliGRAM(s) IV Push every 8 hours PRN Nausea and/or Vomiting      Allergies    strawberry (Anaphylaxis)  Drug Allergies Not Recorded    Intolerances        Vital Signs Last 24 Hrs  T(C): 37.1 (10 Apr 2024 16:08), Max: 37.1 (09 Apr 2024 23:43)  T(F): 98.8 (10 Apr 2024 16:08), Max: 98.8 (10 Apr 2024 16:08)  HR: 81 (10 Apr 2024 16:08) (81 - 99)  BP: 134/69 (10 Apr 2024 16:08) (112/67 - 151/72)  BP(mean): --  RR: 17 (10 Apr 2024 16:08) (15 - 18)  SpO2: 98% (10 Apr 2024 16:08) (96% - 100%)    Parameters below as of 10 Apr 2024 16:08  Patient On (Oxygen Delivery Method): room air        PHYSICAL EXAM:  GENERAL: NAD   HEAD:  Atraumatic, Normocephalic  EYES: EOMI, PERRLA   NECK: Supple   NERVOUS SYSTEM:  Alert & Oriented X3, Good concentration   CHEST/LUNG: Clear to auscultation bilaterally; No rales, rhonchi, wheezing, or rubs  HEART: Regular rate and rhythm; No murmurs, rubs, or gallops  ABDOMEN: Soft, Nontender, Nondistended; Bowel sounds present  EXTREMITIES: No clubbing, cyanosis, or edema       LABS:                        7.5    8.93  )-----------( 169      ( 10 Apr 2024 06:30 )             23.6     04-10    145  |  113<H>  |  5<L>  ----------------------------<  111<H>  3.6   |  27  |  0.84    Ca    8.2<L>      10 Apr 2024 06:30  Phos  3.4     04-10  Mg     2.1     04-10    TPro  5.3<L>  /  Alb  2.6<L>  /  TBili  0.5  /  DBili  x   /  AST  14<L>  /  ALT  11<L>  /  AlkPhos  26<L>  04-10    PT/INR - ( 09 Apr 2024 04:36 )   PT: 11.1 sec;   INR: 0.93 ratio         PTT - ( 09 Apr 2024 04:36 )  PTT:25.8 sec  Urinalysis Basic - ( 10 Apr 2024 06:30 )    Color: x / Appearance: x / SG: x / pH: x  Gluc: 111 mg/dL / Ketone: x  / Bili: x / Urobili: x   Blood: x / Protein: x / Nitrite: x   Leuk Esterase: x / RBC: x / WBC x   Sq Epi: x / Non Sq Epi: x / Bacteria: x      CAPILLARY BLOOD GLUCOSE      POCT Blood Glucose.: 92 mg/dL (10 Apr 2024 16:47)  POCT Blood Glucose.: 131 mg/dL (10 Apr 2024 11:19)  POCT Blood Glucose.: 121 mg/dL (10 Apr 2024 08:00)  POCT Blood Glucose.: 107 mg/dL (09 Apr 2024 21:36)      RADIOLOGY & ADDITIONAL TESTS:    04-09-24 @ 07:01  -  04-10-24 @ 07:00  --------------------------------------------------------  IN:    Lactated Ringers: 625 mL    Oral Fluid: 360 mL  Total IN: 985 mL    OUT:  Total OUT: 0 mL    Total NET: 985 mL      04-10-24 @ 07:01  -  04-10-24 @ 18:51  --------------------------------------------------------  IN:    Oral Fluid: 840 mL  Total IN: 840 mL    OUT:  Total OUT: 0 mL    Total NET: 840 mL

## 2024-04-10 NOTE — DIETITIAN INITIAL EVALUATION ADULT - PERTINENT LABORATORY DATA
04-10    145  |  113<H>  |  5<L>  ----------------------------<  111<H>  3.6   |  27  |  0.84    Ca    8.2<L>      10 Apr 2024 06:30  Phos  3.4     04-10  Mg     2.1     04-10    TPro  5.3<L>  /  Alb  2.6<L>  /  TBili  0.5  /  DBili  x   /  AST  14<L>  /  ALT  11<L>  /  AlkPhos  26<L>  04-10  POCT Blood Glucose.: 131 mg/dL (04-10-24 @ 11:19)  A1C with Estimated Average Glucose Result: 5.9 % (04-10-24 @ 06:30)

## 2024-04-10 NOTE — DIETITIAN INITIAL EVALUATION ADULT - PERTINENT MEDS FT
MEDICATIONS  (STANDING):  bisacodyl 10 milliGRAM(s) Oral once  dextrose 10% Bolus 125 milliLiter(s) IV Bolus once  dextrose 5%. 1000 milliLiter(s) (100 mL/Hr) IV Continuous <Continuous>  dextrose 5%. 1000 milliLiter(s) (50 mL/Hr) IV Continuous <Continuous>  dextrose 50% Injectable 12.5 Gram(s) IV Push once  dextrose 50% Injectable 25 Gram(s) IV Push once  glucagon  Injectable 1 milliGRAM(s) IntraMuscular once  insulin lispro (ADMELOG) corrective regimen sliding scale   SubCutaneous at bedtime  insulin lispro (ADMELOG) corrective regimen sliding scale   SubCutaneous three times a day before meals  lactated ringers. 1000 milliLiter(s) (125 mL/Hr) IV Continuous <Continuous>  losartan 25 milliGRAM(s) Oral daily  pantoprazole  Injectable 40 milliGRAM(s) IV Push every 12 hours  polyethylene glycol/electrolyte Solution. 4000 milliLiter(s) Oral once  simvastatin 20 milliGRAM(s) Oral at bedtime    MEDICATIONS  (PRN):  acetaminophen     Tablet .. 650 milliGRAM(s) Oral every 6 hours PRN Mild Pain (1 - 3), Moderate Pain (4 - 6)  dextrose Oral Gel 15 Gram(s) Oral once PRN Blood Glucose LESS THAN 70 milliGRAM(s)/deciliter  melatonin 3 milliGRAM(s) Oral at bedtime PRN Insomnia  ondansetron Injectable 4 milliGRAM(s) IV Push every 8 hours PRN Nausea and/or Vomiting

## 2024-04-10 NOTE — PROGRESS NOTE ADULT - ASSESSMENT
68 year old female with h/o HTN, HLD, DM admitted with GI bleed found to have incidental right renal masses as described above.     - Discussed with Patient to f/u with Urologist as outpatient for surgical intervention of renal mass  -continue care per primary team and GI  -case discussed with Dr Dover

## 2024-04-10 NOTE — PROGRESS NOTE ADULT - ASSESSMENT
68 year old female with history of HTN, HLD, DM II presented w/ blood in stool and had one episode of syncope during her BM.  CTA on admission showed no active GI bleed, but there was colonic diverticulosis and 2 right renal masses.     Acute blood loss anemia due to acute GI bleeding  - s/p 2 unit of pRBC  - likely diverticular bleed  - monitor H/H, transfuse to keep Hb > 7 or if patient is symptomatic. Repeat Hb ordered  - c/w Protonix   - GI planning on EGD/ colonoscopy tomorrow    Syncope  - likely vasovagal due to GI bleed   - check orthostasis monitor  - Monitor H/H  - get Echo    Right renal mass  - CT abd/pelvis with 2 right renal masses with features concerning for neoplasm. No metastases are evident within the abdomen or pelvis  - patient reported a 20 lb weight loss within last year  - Urology note read and appreciated     HTN  - c/w     HLD  - c/w simvastatin    DM II  - patient is on metformin at home  - c/w ISS, hypoglycemia protocol  - Hgb A1C is     Prophylaxis:  DVT:  GI:    68 year old female with history of HTN, HLD, DM II presented w/ blood in stool and had one episode of syncope during her BM.  CTA on admission showed no active GI bleed, but there was colonic diverticulosis and 2 right renal masses.     Acute blood loss anemia due to acute GI bleeding  - s/p 3 unit of pRBC  - likely diverticular bleed  - monitor H/H, transfuse to keep Hb > 7 or if patient is symptomatic. Repeat Hb ordered  - c/w Protonix   - GI planning on EGD/ colonoscopy tomorrow    Syncope  - likely vasovagal due to GI bleed   - check orthostasis monitor  - Monitor H/H  - get Echo    Right renal mass  - CT abd/pelvis with 2 right renal masses with features concerning for neoplasm. No metastases are evident within the abdomen or pelvis  - patient reported a 20 lb weight loss within last year  - renal US showed multiple echogenic solid masses of the right kidney   - Urology note read and appreciated - pt will need eventual surgical intervention, can be done as an outpt    HTN  - c/w losartan    HLD  - c/w simvastatin    DM II  - patient is on metformin at home  - c/w ISS, hypoglycemia protocol  - Hgb A1C is 5.9    Prophylaxis:  DVT: protonix  GI: SCD

## 2024-04-10 NOTE — DIETITIAN INITIAL EVALUATION ADULT - DIET TYPE
Diet advancement as per GI.  Once medically appropriate consider consistent carbohydrate, low sodium, and add low fiber if GI findings warrant it

## 2024-04-10 NOTE — DIETITIAN INITIAL EVALUATION ADULT - OTHER INFO
Denies history difficulty chewing/swallowing. Reports weight loss PTA but unsure of timeframe and amount of weight lost. Pt with T2DM; Metformin PTA as per H&P. HbA1c 5.9% indicates good blood glucose management.  Pt educated on clears diet- questions answered to her satisfaction.  Pt made aware RD remains available.

## 2024-04-11 LAB
ANION GAP SERPL CALC-SCNC: 5 MMOL/L — SIGNIFICANT CHANGE UP (ref 5–17)
BASOPHILS # BLD AUTO: 0.05 K/UL — SIGNIFICANT CHANGE UP (ref 0–0.2)
BASOPHILS NFR BLD AUTO: 0.6 % — SIGNIFICANT CHANGE UP (ref 0–2)
BLD GP AB SCN SERPL QL: SIGNIFICANT CHANGE UP
BUN SERPL-MCNC: 3 MG/DL — LOW (ref 7–23)
CALCIUM SERPL-MCNC: 8.5 MG/DL — SIGNIFICANT CHANGE UP (ref 8.5–10.1)
CHLORIDE SERPL-SCNC: 112 MMOL/L — HIGH (ref 96–108)
CO2 SERPL-SCNC: 27 MMOL/L — SIGNIFICANT CHANGE UP (ref 22–31)
CREAT SERPL-MCNC: 0.82 MG/DL — SIGNIFICANT CHANGE UP (ref 0.5–1.3)
EGFR: 77 ML/MIN/1.73M2 — SIGNIFICANT CHANGE UP
EOSINOPHIL # BLD AUTO: 0.25 K/UL — SIGNIFICANT CHANGE UP (ref 0–0.5)
EOSINOPHIL NFR BLD AUTO: 3.1 % — SIGNIFICANT CHANGE UP (ref 0–6)
GLUCOSE BLDC GLUCOMTR-MCNC: 102 MG/DL — HIGH (ref 70–99)
GLUCOSE BLDC GLUCOMTR-MCNC: 114 MG/DL — HIGH (ref 70–99)
GLUCOSE BLDC GLUCOMTR-MCNC: 82 MG/DL — SIGNIFICANT CHANGE UP (ref 70–99)
GLUCOSE BLDC GLUCOMTR-MCNC: 84 MG/DL — SIGNIFICANT CHANGE UP (ref 70–99)
GLUCOSE SERPL-MCNC: 95 MG/DL — SIGNIFICANT CHANGE UP (ref 70–99)
HCT VFR BLD CALC: 29.2 % — LOW (ref 34.5–45)
HGB BLD-MCNC: 9.3 G/DL — LOW (ref 11.5–15.5)
IMM GRANULOCYTES NFR BLD AUTO: 0.4 % — SIGNIFICANT CHANGE UP (ref 0–0.9)
LYMPHOCYTES # BLD AUTO: 1.69 K/UL — SIGNIFICANT CHANGE UP (ref 1–3.3)
LYMPHOCYTES # BLD AUTO: 21.1 % — SIGNIFICANT CHANGE UP (ref 13–44)
MAGNESIUM SERPL-MCNC: 2 MG/DL — SIGNIFICANT CHANGE UP (ref 1.6–2.6)
MCHC RBC-ENTMCNC: 26.2 PG — LOW (ref 27–34)
MCHC RBC-ENTMCNC: 31.8 G/DL — LOW (ref 32–36)
MCV RBC AUTO: 82.3 FL — SIGNIFICANT CHANGE UP (ref 80–100)
MONOCYTES # BLD AUTO: 0.6 K/UL — SIGNIFICANT CHANGE UP (ref 0–0.9)
MONOCYTES NFR BLD AUTO: 7.5 % — SIGNIFICANT CHANGE UP (ref 2–14)
NEUTROPHILS # BLD AUTO: 5.38 K/UL — SIGNIFICANT CHANGE UP (ref 1.8–7.4)
NEUTROPHILS NFR BLD AUTO: 67.3 % — SIGNIFICANT CHANGE UP (ref 43–77)
NRBC # BLD: 0 /100 WBCS — SIGNIFICANT CHANGE UP (ref 0–0)
PHOSPHATE SERPL-MCNC: 3.5 MG/DL — SIGNIFICANT CHANGE UP (ref 2.5–4.5)
PLATELET # BLD AUTO: 192 K/UL — SIGNIFICANT CHANGE UP (ref 150–400)
POTASSIUM SERPL-MCNC: 3.5 MMOL/L — SIGNIFICANT CHANGE UP (ref 3.5–5.3)
POTASSIUM SERPL-SCNC: 3.5 MMOL/L — SIGNIFICANT CHANGE UP (ref 3.5–5.3)
RBC # BLD: 3.55 M/UL — LOW (ref 3.8–5.2)
RBC # FLD: 17.2 % — HIGH (ref 10.3–14.5)
SODIUM SERPL-SCNC: 144 MMOL/L — SIGNIFICANT CHANGE UP (ref 135–145)
WBC # BLD: 8 K/UL — SIGNIFICANT CHANGE UP (ref 3.8–10.5)
WBC # FLD AUTO: 8 K/UL — SIGNIFICANT CHANGE UP (ref 3.8–10.5)

## 2024-04-11 PROCEDURE — 99232 SBSQ HOSP IP/OBS MODERATE 35: CPT

## 2024-04-11 PROCEDURE — 88312 SPECIAL STAINS GROUP 1: CPT | Mod: 26

## 2024-04-11 PROCEDURE — 88305 TISSUE EXAM BY PATHOLOGIST: CPT | Mod: 26

## 2024-04-11 RX ORDER — SODIUM CHLORIDE 9 MG/ML
1000 INJECTION, SOLUTION INTRAVENOUS
Refills: 0 | Status: DISCONTINUED | OUTPATIENT
Start: 2024-04-11 | End: 2024-04-11

## 2024-04-11 RX ADMIN — PANTOPRAZOLE SODIUM 40 MILLIGRAM(S): 20 TABLET, DELAYED RELEASE ORAL at 05:57

## 2024-04-11 RX ADMIN — SIMVASTATIN 20 MILLIGRAM(S): 20 TABLET, FILM COATED ORAL at 21:11

## 2024-04-11 RX ADMIN — SODIUM CHLORIDE 75 MILLILITER(S): 9 INJECTION, SOLUTION INTRAVENOUS at 17:03

## 2024-04-11 RX ADMIN — PANTOPRAZOLE SODIUM 40 MILLIGRAM(S): 20 TABLET, DELAYED RELEASE ORAL at 17:15

## 2024-04-11 NOTE — PROGRESS NOTE ADULT - ASSESSMENT
68 year old female with history of HTN, HLD, DM II presented w/ blood in stool and had one episode of syncope during her BM.  CTA on admission showed no active GI bleed, but there was colonic diverticulosis and 2 right renal masses.     Acute blood loss anemia due to acute GI bleeding  - s/p 3 unit of pRBC  - likely diverticular bleed  - monitor H/H, transfuse to keep Hb > 7 or if patient is symptomatic. Repeat Hb ordered  - c/w Protonix   - EGD on 4/11 showed Gastritis w/ Hiatal Hernia - will follow up path  - colonoscopy on 4/11 showed  Diverticulosis w/ Colon Polyps (small) and Internal Hemorrhoids  - GI suspects patient had diverticular bleed    Syncope  - likely vasovagal due to GI bleed   - check orthostasis monitor  - Monitor H/H  - Follow up Echo read    Right renal mass  - CT abd/pelvis with 2 right renal masses with features concerning for neoplasm. No metastases are evident within the abdomen or pelvis  - patient reported a 20 lb weight loss within last year  - renal US showed multiple echogenic solid masses of the right kidney   - Urology note read and appreciated - pt will need eventual surgical intervention, can be done as an outpt    HTN  - c/w losartan    HLD  - c/w simvastatin    DM II  - patient is on metformin at home  - c/w ISS, hypoglycemia protocol  - Hgb A1C is 5.9    Prophylaxis:  DVT: protonix  GI: SCD

## 2024-04-11 NOTE — PRE-OP CHECKLIST - SURGICAL CONSENT
Tramadol and Lorazepam   PDMP  08/15/2019  1  08/15/2019  TRAMADOL HCL 50 MG TABLET  100 0  13     08/15/2019  1  08/15/2019  LORAZEPAM 2 MG TABLET  120 0  30       HENRY FRANCO
done

## 2024-04-11 NOTE — PROGRESS NOTE ADULT - SUBJECTIVE AND OBJECTIVE BOX
68 year old female with history of HTN, HLD, DM II presented w/ blood in stool and had one episode of syncope during her BM.  CTA on admission showed no active GI bleed, but there was colonic diverticulosis and 2 right renal masses. She is lying in bed in NAD.      MEDICATIONS  (STANDING):  dextrose 10% Bolus 125 milliLiter(s) IV Bolus once  dextrose 5%. 1000 milliLiter(s) (50 mL/Hr) IV Continuous <Continuous>  dextrose 5%. 1000 milliLiter(s) (100 mL/Hr) IV Continuous <Continuous>  dextrose 50% Injectable 25 Gram(s) IV Push once  dextrose 50% Injectable 12.5 Gram(s) IV Push once  glucagon  Injectable 1 milliGRAM(s) IntraMuscular once  insulin lispro (ADMELOG) corrective regimen sliding scale   SubCutaneous at bedtime  insulin lispro (ADMELOG) corrective regimen sliding scale   SubCutaneous three times a day before meals  lactated ringers. 1000 milliLiter(s) (75 mL/Hr) IV Continuous <Continuous>  lactated ringers. 1000 milliLiter(s) (125 mL/Hr) IV Continuous <Continuous>  losartan 25 milliGRAM(s) Oral daily  pantoprazole  Injectable 40 milliGRAM(s) IV Push every 12 hours  simvastatin 20 milliGRAM(s) Oral at bedtime    MEDICATIONS  (PRN):  acetaminophen     Tablet .. 650 milliGRAM(s) Oral every 6 hours PRN Mild Pain (1 - 3), Moderate Pain (4 - 6)  dextrose Oral Gel 15 Gram(s) Oral once PRN Blood Glucose LESS THAN 70 milliGRAM(s)/deciliter  melatonin 3 milliGRAM(s) Oral at bedtime PRN Insomnia  ondansetron Injectable 4 milliGRAM(s) IV Push every 8 hours PRN Nausea and/or Vomiting      Allergies    strawberry (Anaphylaxis)  No Known Drug Allergies    Intolerances        Vital Signs Last 24 Hrs  T(C): 36.9 (11 Apr 2024 16:45), Max: 36.9 (11 Apr 2024 13:25)  T(F): 98.4 (11 Apr 2024 16:45), Max: 98.4 (11 Apr 2024 13:25)  HR: 87 (11 Apr 2024 16:45) (81 - 99)  BP: 155/74 (11 Apr 2024 16:45) (120/70 - 155/74)  BP(mean): --  RR: 16 (11 Apr 2024 16:45) (15 - 21)  SpO2: 99% (11 Apr 2024 16:45) (97% - 100%)    Parameters below as of 11 Apr 2024 15:45  Patient On (Oxygen Delivery Method): room air        PHYSICAL EXAM:  GENERAL: NAD   HEAD:  Atraumatic, Normocephalic  EYES: EOMI, PERRLA   NECK: Supple   NERVOUS SYSTEM:  Alert & Oriented X3, Good concentration   CHEST/LUNG: Clear to auscultation bilaterally; No rales, rhonchi, wheezing, or rubs  HEART: Regular rate and rhythm; No murmurs, rubs, or gallops  ABDOMEN: Soft, Nontender, Nondistended; Bowel sounds present  EXTREMITIES: No clubbing, cyanosis, or edema      LABS:                        9.3    8.00  )-----------( 192      ( 11 Apr 2024 05:56 )             29.2     04-11    144  |  112<H>  |  3<L>  ----------------------------<  95  3.5   |  27  |  0.82    Ca    8.5      11 Apr 2024 05:56  Phos  3.5     04-11  Mg     2.0     04-11    TPro  5.3<L>  /  Alb  2.6<L>  /  TBili  0.5  /  DBili  x   /  AST  14<L>  /  ALT  11<L>  /  AlkPhos  26<L>  04-10      Urinalysis Basic - ( 11 Apr 2024 05:56 )    Color: x / Appearance: x / SG: x / pH: x  Gluc: 95 mg/dL / Ketone: x  / Bili: x / Urobili: x   Blood: x / Protein: x / Nitrite: x   Leuk Esterase: x / RBC: x / WBC x   Sq Epi: x / Non Sq Epi: x / Bacteria: x         POCT Blood Glucose.: 84 mg/dL (11 Apr 2024 16:16)  POCT Blood Glucose.: 82 mg/dL (11 Apr 2024 11:52)  POCT Blood Glucose.: 102 mg/dL (11 Apr 2024 08:36)  POCT Blood Glucose.: 122 mg/dL (10 Apr 2024 21:21)      RADIOLOGY & ADDITIONAL TESTS:    04-10-24 @ 07:01  -  04-11-24 @ 07:00  --------------------------------------------------------  IN:    Oral Fluid: 840 mL  Total IN: 840 mL    OUT:  Total OUT: 0 mL    Total NET: 840 mL

## 2024-04-11 NOTE — PROGRESS NOTE ADULT - SUBJECTIVE AND OBJECTIVE BOX
see colonoscopy report    Procedure:  Colonoscopy + Hot Bx    Imp: LGI Bleed; Diverticulosis; Colon Polyps (small); Internal Hemorrhoids    Plan: advance diet; CBC in AM; Pt likely s/p diverticular bleed

## 2024-04-11 NOTE — PROGRESS NOTE ADULT - SUBJECTIVE AND OBJECTIVE BOX
see gastroscopy report    Imp: GI Bleed; Gastritis Hiatal Hernia    Plan: check path; continue protonix; for colonoscopy now

## 2024-04-12 ENCOUNTER — TRANSCRIPTION ENCOUNTER (OUTPATIENT)
Age: 69
End: 2024-04-12

## 2024-04-12 VITALS
DIASTOLIC BLOOD PRESSURE: 65 MMHG | SYSTOLIC BLOOD PRESSURE: 105 MMHG | OXYGEN SATURATION: 98 % | TEMPERATURE: 98 F | RESPIRATION RATE: 18 BRPM | HEART RATE: 92 BPM

## 2024-04-12 LAB
GLUCOSE BLDC GLUCOMTR-MCNC: 102 MG/DL — HIGH (ref 70–99)
GLUCOSE BLDC GLUCOMTR-MCNC: 111 MG/DL — HIGH (ref 70–99)
GLUCOSE BLDC GLUCOMTR-MCNC: 148 MG/DL — HIGH (ref 70–99)
HCT VFR BLD CALC: 30.2 % — LOW (ref 34.5–45)
HGB BLD-MCNC: 9.5 G/DL — LOW (ref 11.5–15.5)
MCHC RBC-ENTMCNC: 25.7 PG — LOW (ref 27–34)
MCHC RBC-ENTMCNC: 31.5 G/DL — LOW (ref 32–36)
MCV RBC AUTO: 81.8 FL — SIGNIFICANT CHANGE UP (ref 80–100)
NRBC # BLD: 0 /100 WBCS — SIGNIFICANT CHANGE UP (ref 0–0)
PLATELET # BLD AUTO: 233 K/UL — SIGNIFICANT CHANGE UP (ref 150–400)
RBC # BLD: 3.69 M/UL — LOW (ref 3.8–5.2)
RBC # FLD: 17.9 % — HIGH (ref 10.3–14.5)
WBC # BLD: 8.55 K/UL — SIGNIFICANT CHANGE UP (ref 3.8–10.5)
WBC # FLD AUTO: 8.55 K/UL — SIGNIFICANT CHANGE UP (ref 3.8–10.5)

## 2024-04-12 PROCEDURE — 99239 HOSP IP/OBS DSCHRG MGMT >30: CPT

## 2024-04-12 RX ORDER — PANTOPRAZOLE SODIUM 20 MG/1
1 TABLET, DELAYED RELEASE ORAL
Qty: 30 | Refills: 0
Start: 2024-04-12 | End: 2024-05-11

## 2024-04-12 RX ADMIN — LOSARTAN POTASSIUM 25 MILLIGRAM(S): 100 TABLET, FILM COATED ORAL at 05:54

## 2024-04-12 RX ADMIN — PANTOPRAZOLE SODIUM 40 MILLIGRAM(S): 20 TABLET, DELAYED RELEASE ORAL at 05:54

## 2024-04-12 RX ADMIN — PANTOPRAZOLE SODIUM 40 MILLIGRAM(S): 20 TABLET, DELAYED RELEASE ORAL at 17:36

## 2024-04-12 NOTE — PROGRESS NOTE ADULT - SUBJECTIVE AND OBJECTIVE BOX
Pt tolerated colonoscopy well  ?probable diverticular bleed but no further bleeding noted      MEDICATIONS  (STANDING):  dextrose 10% Bolus 125 milliLiter(s) IV Bolus once  dextrose 5%. 1000 milliLiter(s) (50 mL/Hr) IV Continuous <Continuous>  dextrose 5%. 1000 milliLiter(s) (100 mL/Hr) IV Continuous <Continuous>  dextrose 50% Injectable 12.5 Gram(s) IV Push once  dextrose 50% Injectable 25 Gram(s) IV Push once  glucagon  Injectable 1 milliGRAM(s) IntraMuscular once  insulin lispro (ADMELOG) corrective regimen sliding scale   SubCutaneous three times a day before meals  insulin lispro (ADMELOG) corrective regimen sliding scale   SubCutaneous at bedtime  lactated ringers. 1000 milliLiter(s) (125 mL/Hr) IV Continuous <Continuous>  losartan 25 milliGRAM(s) Oral daily  pantoprazole  Injectable 40 milliGRAM(s) IV Push every 12 hours  simvastatin 20 milliGRAM(s) Oral at bedtime    MEDICATIONS  (PRN):  acetaminophen     Tablet .. 650 milliGRAM(s) Oral every 6 hours PRN Mild Pain (1 - 3), Moderate Pain (4 - 6)  dextrose Oral Gel 15 Gram(s) Oral once PRN Blood Glucose LESS THAN 70 milliGRAM(s)/deciliter  melatonin 3 milliGRAM(s) Oral at bedtime PRN Insomnia  ondansetron Injectable 4 milliGRAM(s) IV Push every 8 hours PRN Nausea and/or Vomiting      Allergies    strawberry (Anaphylaxis)  No Known Drug Allergies    Intolerances        Vital Signs Last 24 Hrs  T(C): 36.9 (12 Apr 2024 03:45), Max: 36.9 (11 Apr 2024 13:25)  T(F): 98.4 (12 Apr 2024 03:45), Max: 98.5 (11 Apr 2024 18:05)  HR: 97 (12 Apr 2024 03:45) (82 - 100)  BP: 130/67 (12 Apr 2024 03:45) (115/72 - 155/74)  BP(mean): --  RR: 18 (12 Apr 2024 03:45) (15 - 21)  SpO2: 98% (12 Apr 2024 03:45) (96% - 100%)    Parameters below as of 12 Apr 2024 03:45  Patient On (Oxygen Delivery Method): room air        PHYSICAL EXAM:  General: NAD.  CVS: S1, S2  Chest: air entry bilaterally present  Abd: BS present, soft, non-tender      LABS:                        9.3    8.00  )-----------( 192      ( 11 Apr 2024 05:56 )             29.2     04-11    144  |  112<H>  |  3<L>  ----------------------------<  95  3.5   |  27  |  0.82    Ca    8.5      11 Apr 2024 05:56  Phos  3.5     04-11  Mg     2.0     04-11        diet as tolerated  follow CBC  discharge planning

## 2024-04-12 NOTE — DISCHARGE NOTE PROVIDER - HOSPITAL COURSE
68 year old female with history of HTN, HLD, DM II presented w/ blood in stool and had one episode of syncope during her BM.  CTA on admission showed no active GI bleed, but there was colonic diverticulosis and 2 right renal masses. Pt was admitted w/ acute blood loss anemia due to acute GI bleeding and was given a total of 3 unit of pRBC. Her bleed was likely diverticular as per GI. EGD on 4/11 showed Gastritis w/ Hiatal Hernia, path pending. Colonoscopy on 4/11 showed diverticulosis w/ Colon Polyps (small) and Internal Hemorrhoids. Her syncope was likely vasovagal. Echo showed normal global left ventricular systolic function w/ trace mitral valve regurgitation. Othostatics were negative. Of note, her CT abd/pelvis with 2 right renal masses with features concerning for neoplasm. No metastases were evident within the abdomen or pelvis. Patient also reported a 20 lb weight loss within last year. Renal US showed multiple echogenic solid masses of the right kidney. Urology was consulted and noted that the patient will need eventual surgical intervention, which will be done as an outpatient. Pt was cleared for discharge by GI.    Discharge time: 43 minutes       Vital Signs Last 24 Hrs  T(C): 36.9 (12 Apr 2024 10:56), Max: 36.9 (11 Apr 2024 13:25)  T(F): 98.5 (12 Apr 2024 10:56), Max: 98.5 (11 Apr 2024 18:05)  HR: 92 (12 Apr 2024 10:56) (82 - 100)  BP: 125/69 (12 Apr 2024 10:56) (115/72 - 155/74)   RR: 18 (12 Apr 2024 10:56) (15 - 21)  SpO2: 97% (12 Apr 2024 10:56) (96% - 100%)    Parameters below as of 12 Apr 2024 03:45  Patient On (Oxygen Delivery Method): room air     PHYSICAL EXAM:  GENERAL: NAD   HEAD:  Atraumatic, Normocephalic  EYES: EOMI, PERRLA   NECK: Supple   NERVOUS SYSTEM:  Alert & Oriented X3, Good concentration   CHEST/LUNG: Clear to auscultation bilaterally; No rales, rhonchi, wheezing, or rubs  HEART: Regular rate and rhythm; No murmurs, rubs, or gallops  ABDOMEN: Soft, Nontender, Nondistended; Bowel sounds present  EXTREMITIES: No clubbing, cyanosis, or edema

## 2024-04-12 NOTE — DISCHARGE NOTE NURSING/CASE MANAGEMENT/SOCIAL WORK - PATIENT PORTAL LINK FT
You can access the FollowMyHealth Patient Portal offered by [facility] by registering at the following website: [email or web address]. By joining [email or web address]’s FollowMyHealth portal, you will also be able to view your health information using other applications (apps) compatible with our system.

## 2024-04-12 NOTE — DISCHARGE NOTE PROVIDER - NSDCMRMEDTOKEN_GEN_ALL_CORE_FT
losartan 25 mg oral tablet: 1 tab(s) orally once a day  MetFORMIN (Eqv-Glucophage XR) 500 mg oral tablet, extended release: 2 tab(s) orally once a day  simvastatin 20 mg oral tablet: 1 tab(s) orally once a day (at bedtime)   losartan 25 mg oral tablet: 1 tab(s) orally once a day  MetFORMIN (Eqv-Glucophage XR) 500 mg oral tablet, extended release: 2 tab(s) orally once a day  pantoprazole 40 mg oral delayed release tablet: 1 tab(s) orally once a day  simvastatin 20 mg oral tablet: 1 tab(s) orally once a day (at bedtime)

## 2024-04-12 NOTE — CHART NOTE - NSCHARTNOTEFT_GEN_A_CORE
4/12/24  To whom it may concern:    Ms. Eunice Reed was admitted on 4/9/24 and discharged on 4/12/24. For medically necessary reasons, she was hospitalized at [facility] and should be excused from any workplace absence during this time.     If there are any questions please call at [phone][place] [address]    Thank you very much.

## 2024-04-12 NOTE — DISCHARGE NOTE PROVIDER - CARE PROVIDER_API CALL
Ismael Ferreira  Gastroenterology  [address]  [place] [address]  Phone: [phone]  Fax: [phone]  Follow Up Time: 1 week    Kvng [facility]  Urology  [address]  [place] [address]  Phone: [phone]  Fax: [phone]  Follow Up Time: 1 week

## 2024-04-12 NOTE — DISCHARGE NOTE PROVIDER - NSDCCPCAREPLAN_GEN_ALL_CORE_FT
PRINCIPAL DISCHARGE DIAGNOSIS  Diagnosis: Gastrointestinal bleed  Assessment and Plan of Treatment:       SECONDARY DISCHARGE DIAGNOSES  Diagnosis: Anemia requiring transfusions  Assessment and Plan of Treatment:

## 2024-04-12 NOTE — DISCHARGE NOTE PROVIDER - PROVIDER TOKENS
Ok to take 2 tablets BID rather than 1 QID   PROVIDER:[TOKEN:[[id number]],FOLLOWUP:[1 week]],PROVIDER:[TOKEN:[[id number]],FOLLOWUP:[1 week]]

## 2024-04-15 PROBLEM — I10 ESSENTIAL (PRIMARY) HYPERTENSION: Chronic | Status: ACTIVE | Noted: 2024-04-09

## 2024-04-15 LAB — SURGICAL PATHOLOGY STUDY: SIGNIFICANT CHANGE UP

## 2024-04-17 ENCOUNTER — INPATIENT (INPATIENT)
Facility: HOSPITAL | Age: 69
LOS: 2 days | Discharge: HOME | End: 2024-04-20
Attending: STUDENT IN AN ORGANIZED HEALTH CARE EDUCATION/TRAINING PROGRAM | Admitting: STUDENT IN AN ORGANIZED HEALTH CARE EDUCATION/TRAINING PROGRAM
Payer: COMMERCIAL

## 2024-04-17 VITALS
HEIGHT: 67 IN | OXYGEN SATURATION: 99 % | HEART RATE: 122 BPM | DIASTOLIC BLOOD PRESSURE: 66 MMHG | RESPIRATION RATE: 18 BRPM | SYSTOLIC BLOOD PRESSURE: 106 MMHG | TEMPERATURE: 98 F | WEIGHT: 171.96 LBS

## 2024-04-17 DIAGNOSIS — I10 ESSENTIAL (PRIMARY) HYPERTENSION: ICD-10-CM

## 2024-04-17 DIAGNOSIS — N28.89 OTHER SPECIFIED DISORDERS OF KIDNEY AND URETER: ICD-10-CM

## 2024-04-17 DIAGNOSIS — E78.5 HYPERLIPIDEMIA, UNSPECIFIED: ICD-10-CM

## 2024-04-17 DIAGNOSIS — K92.2 GASTROINTESTINAL HEMORRHAGE, UNSPECIFIED: ICD-10-CM

## 2024-04-17 DIAGNOSIS — E11.9 TYPE 2 DIABETES MELLITUS WITHOUT COMPLICATIONS: ICD-10-CM

## 2024-04-17 DIAGNOSIS — E87.20 ACIDOSIS, UNSPECIFIED: ICD-10-CM

## 2024-04-17 DIAGNOSIS — D62 ACUTE POSTHEMORRHAGIC ANEMIA: ICD-10-CM

## 2024-04-17 DIAGNOSIS — K29.70 GASTRITIS, UNSPECIFIED, WITHOUT BLEEDING: ICD-10-CM

## 2024-04-17 LAB
ALBUMIN SERPL ELPH-MCNC: 2.7 G/DL — LOW (ref 3.3–5)
ALP SERPL-CCNC: 31 U/L — LOW (ref 40–120)
ALT FLD-CCNC: 17 U/L — SIGNIFICANT CHANGE UP (ref 12–78)
ANION GAP SERPL CALC-SCNC: 4 MMOL/L — LOW (ref 5–17)
AST SERPL-CCNC: 29 U/L — SIGNIFICANT CHANGE UP (ref 15–37)
BASOPHILS # BLD AUTO: 0.04 K/UL — SIGNIFICANT CHANGE UP (ref 0–0.2)
BASOPHILS # BLD AUTO: 0.04 K/UL — SIGNIFICANT CHANGE UP (ref 0–0.2)
BASOPHILS NFR BLD AUTO: 0.4 % — SIGNIFICANT CHANGE UP (ref 0–2)
BASOPHILS NFR BLD AUTO: 0.5 % — SIGNIFICANT CHANGE UP (ref 0–2)
BILIRUB SERPL-MCNC: 0.6 MG/DL — SIGNIFICANT CHANGE UP (ref 0.2–1.2)
BLD GP AB SCN SERPL QL: SIGNIFICANT CHANGE UP
BUN SERPL-MCNC: 23 MG/DL — SIGNIFICANT CHANGE UP (ref 7–23)
CALCIUM SERPL-MCNC: 8.6 MG/DL — SIGNIFICANT CHANGE UP (ref 8.5–10.1)
CHLORIDE SERPL-SCNC: 114 MMOL/L — HIGH (ref 96–108)
CO2 SERPL-SCNC: 26 MMOL/L — SIGNIFICANT CHANGE UP (ref 22–31)
CREAT SERPL-MCNC: 0.96 MG/DL — SIGNIFICANT CHANGE UP (ref 0.5–1.3)
EGFR: 64 ML/MIN/1.73M2 — SIGNIFICANT CHANGE UP
EOSINOPHIL # BLD AUTO: 0.14 K/UL — SIGNIFICANT CHANGE UP (ref 0–0.5)
EOSINOPHIL # BLD AUTO: 0.24 K/UL — SIGNIFICANT CHANGE UP (ref 0–0.5)
EOSINOPHIL NFR BLD AUTO: 1.4 % — SIGNIFICANT CHANGE UP (ref 0–6)
EOSINOPHIL NFR BLD AUTO: 2.9 % — SIGNIFICANT CHANGE UP (ref 0–6)
GLUCOSE BLDC GLUCOMTR-MCNC: 152 MG/DL — HIGH (ref 70–99)
GLUCOSE SERPL-MCNC: 144 MG/DL — HIGH (ref 70–99)
HCT VFR BLD CALC: 24 % — LOW (ref 34.5–45)
HCT VFR BLD CALC: 25.3 % — LOW (ref 34.5–45)
HGB BLD-MCNC: 7.5 G/DL — LOW (ref 11.5–15.5)
HGB BLD-MCNC: 7.8 G/DL — LOW (ref 11.5–15.5)
IMM GRANULOCYTES NFR BLD AUTO: 0.2 % — SIGNIFICANT CHANGE UP (ref 0–0.9)
IMM GRANULOCYTES NFR BLD AUTO: 0.4 % — SIGNIFICANT CHANGE UP (ref 0–0.9)
LACTATE SERPL-SCNC: 1 MMOL/L — SIGNIFICANT CHANGE UP (ref 0.7–2)
LACTATE SERPL-SCNC: 2.6 MMOL/L — HIGH (ref 0.7–2)
LIDOCAIN IGE QN: 24 U/L — SIGNIFICANT CHANGE UP (ref 13–75)
LYMPHOCYTES # BLD AUTO: 1.77 K/UL — SIGNIFICANT CHANGE UP (ref 1–3.3)
LYMPHOCYTES # BLD AUTO: 1.89 K/UL — SIGNIFICANT CHANGE UP (ref 1–3.3)
LYMPHOCYTES # BLD AUTO: 17.6 % — SIGNIFICANT CHANGE UP (ref 13–44)
LYMPHOCYTES # BLD AUTO: 22.6 % — SIGNIFICANT CHANGE UP (ref 13–44)
MCHC RBC-ENTMCNC: 25.7 PG — LOW (ref 27–34)
MCHC RBC-ENTMCNC: 25.9 PG — LOW (ref 27–34)
MCHC RBC-ENTMCNC: 30.8 G/DL — LOW (ref 32–36)
MCHC RBC-ENTMCNC: 31.3 G/DL — LOW (ref 32–36)
MCV RBC AUTO: 82.8 FL — SIGNIFICANT CHANGE UP (ref 80–100)
MCV RBC AUTO: 83.5 FL — SIGNIFICANT CHANGE UP (ref 80–100)
MONOCYTES # BLD AUTO: 0.56 K/UL — SIGNIFICANT CHANGE UP (ref 0–0.9)
MONOCYTES # BLD AUTO: 0.73 K/UL — SIGNIFICANT CHANGE UP (ref 0–0.9)
MONOCYTES NFR BLD AUTO: 6.7 % — SIGNIFICANT CHANGE UP (ref 2–14)
MONOCYTES NFR BLD AUTO: 7.3 % — SIGNIFICANT CHANGE UP (ref 2–14)
NEUTROPHILS # BLD AUTO: 5.6 K/UL — SIGNIFICANT CHANGE UP (ref 1.8–7.4)
NEUTROPHILS # BLD AUTO: 7.32 K/UL — SIGNIFICANT CHANGE UP (ref 1.8–7.4)
NEUTROPHILS NFR BLD AUTO: 67.1 % — SIGNIFICANT CHANGE UP (ref 43–77)
NEUTROPHILS NFR BLD AUTO: 72.9 % — SIGNIFICANT CHANGE UP (ref 43–77)
NRBC # BLD: 0 /100 WBCS — SIGNIFICANT CHANGE UP (ref 0–0)
NRBC # BLD: 0 /100 WBCS — SIGNIFICANT CHANGE UP (ref 0–0)
PLATELET # BLD AUTO: 232 K/UL — SIGNIFICANT CHANGE UP (ref 150–400)
PLATELET # BLD AUTO: 241 K/UL — SIGNIFICANT CHANGE UP (ref 150–400)
POTASSIUM SERPL-MCNC: 5 MMOL/L — SIGNIFICANT CHANGE UP (ref 3.5–5.3)
POTASSIUM SERPL-SCNC: 5 MMOL/L — SIGNIFICANT CHANGE UP (ref 3.5–5.3)
PROT SERPL-MCNC: 6.4 GM/DL — SIGNIFICANT CHANGE UP (ref 6–8.3)
RBC # BLD: 2.9 M/UL — LOW (ref 3.8–5.2)
RBC # BLD: 3.03 M/UL — LOW (ref 3.8–5.2)
RBC # FLD: 17.1 % — HIGH (ref 10.3–14.5)
RBC # FLD: 17.2 % — HIGH (ref 10.3–14.5)
SODIUM SERPL-SCNC: 144 MMOL/L — SIGNIFICANT CHANGE UP (ref 135–145)
WBC # BLD: 10.04 K/UL — SIGNIFICANT CHANGE UP (ref 3.8–10.5)
WBC # BLD: 8.35 K/UL — SIGNIFICANT CHANGE UP (ref 3.8–10.5)
WBC # FLD AUTO: 10.04 K/UL — SIGNIFICANT CHANGE UP (ref 3.8–10.5)
WBC # FLD AUTO: 8.35 K/UL — SIGNIFICANT CHANGE UP (ref 3.8–10.5)

## 2024-04-17 PROCEDURE — 99222 1ST HOSP IP/OBS MODERATE 55: CPT

## 2024-04-17 PROCEDURE — 99285 EMERGENCY DEPT VISIT HI MDM: CPT

## 2024-04-17 PROCEDURE — 74174 CTA ABD&PLVS W/CONTRAST: CPT | Mod: 26,MC

## 2024-04-17 PROCEDURE — 71045 X-RAY EXAM CHEST 1 VIEW: CPT | Mod: 26

## 2024-04-17 PROCEDURE — 93010 ELECTROCARDIOGRAM REPORT: CPT

## 2024-04-17 RX ORDER — SODIUM CHLORIDE 9 MG/ML
1000 INJECTION INTRAMUSCULAR; INTRAVENOUS; SUBCUTANEOUS ONCE
Refills: 0 | Status: COMPLETED | OUTPATIENT
Start: 2024-04-17 | End: 2024-04-17

## 2024-04-17 RX ORDER — ONDANSETRON 8 MG/1
4 TABLET, FILM COATED ORAL EVERY 8 HOURS
Refills: 0 | Status: DISCONTINUED | OUTPATIENT
Start: 2024-04-17 | End: 2024-04-20

## 2024-04-17 RX ORDER — ACETAMINOPHEN 500 MG
650 TABLET ORAL EVERY 6 HOURS
Refills: 0 | Status: DISCONTINUED | OUTPATIENT
Start: 2024-04-17 | End: 2024-04-20

## 2024-04-17 RX ORDER — GLUCAGON INJECTION, SOLUTION 0.5 MG/.1ML
1 INJECTION, SOLUTION SUBCUTANEOUS ONCE
Refills: 0 | Status: DISCONTINUED | OUTPATIENT
Start: 2024-04-17 | End: 2024-04-20

## 2024-04-17 RX ORDER — PANTOPRAZOLE SODIUM 20 MG/1
40 TABLET, DELAYED RELEASE ORAL
Refills: 0 | Status: DISCONTINUED | OUTPATIENT
Start: 2024-04-17 | End: 2024-04-20

## 2024-04-17 RX ORDER — LANOLIN ALCOHOL/MO/W.PET/CERES
3 CREAM (GRAM) TOPICAL AT BEDTIME
Refills: 0 | Status: DISCONTINUED | OUTPATIENT
Start: 2024-04-17 | End: 2024-04-20

## 2024-04-17 RX ORDER — DEXTROSE 50 % IN WATER 50 %
12.5 SYRINGE (ML) INTRAVENOUS ONCE
Refills: 0 | Status: DISCONTINUED | OUTPATIENT
Start: 2024-04-17 | End: 2024-04-20

## 2024-04-17 RX ORDER — SODIUM CHLORIDE 9 MG/ML
1000 INJECTION, SOLUTION INTRAVENOUS
Refills: 0 | Status: DISCONTINUED | OUTPATIENT
Start: 2024-04-17 | End: 2024-04-20

## 2024-04-17 RX ORDER — DEXTROSE 10 % IN WATER 10 %
125 INTRAVENOUS SOLUTION INTRAVENOUS ONCE
Refills: 0 | Status: DISCONTINUED | OUTPATIENT
Start: 2024-04-17 | End: 2024-04-20

## 2024-04-17 RX ORDER — LOSARTAN POTASSIUM 100 MG/1
25 TABLET, FILM COATED ORAL DAILY
Refills: 0 | Status: DISCONTINUED | OUTPATIENT
Start: 2024-04-17 | End: 2024-04-20

## 2024-04-17 RX ORDER — DEXTROSE 50 % IN WATER 50 %
15 SYRINGE (ML) INTRAVENOUS ONCE
Refills: 0 | Status: DISCONTINUED | OUTPATIENT
Start: 2024-04-17 | End: 2024-04-20

## 2024-04-17 RX ORDER — DEXTROSE 50 % IN WATER 50 %
25 SYRINGE (ML) INTRAVENOUS ONCE
Refills: 0 | Status: DISCONTINUED | OUTPATIENT
Start: 2024-04-17 | End: 2024-04-20

## 2024-04-17 RX ORDER — SIMVASTATIN 20 MG/1
20 TABLET, FILM COATED ORAL AT BEDTIME
Refills: 0 | Status: DISCONTINUED | OUTPATIENT
Start: 2024-04-17 | End: 2024-04-20

## 2024-04-17 RX ORDER — SODIUM CHLORIDE 9 MG/ML
1000 INJECTION INTRAMUSCULAR; INTRAVENOUS; SUBCUTANEOUS
Refills: 0 | Status: DISCONTINUED | OUTPATIENT
Start: 2024-04-17 | End: 2024-04-20

## 2024-04-17 RX ORDER — INSULIN LISPRO 100/ML
VIAL (ML) SUBCUTANEOUS
Refills: 0 | Status: DISCONTINUED | OUTPATIENT
Start: 2024-04-17 | End: 2024-04-20

## 2024-04-17 RX ADMIN — SODIUM CHLORIDE 1000 MILLILITER(S): 9 INJECTION INTRAMUSCULAR; INTRAVENOUS; SUBCUTANEOUS at 17:40

## 2024-04-17 RX ADMIN — SODIUM CHLORIDE 100 MILLILITER(S): 9 INJECTION INTRAMUSCULAR; INTRAVENOUS; SUBCUTANEOUS at 23:31

## 2024-04-17 RX ADMIN — SODIUM CHLORIDE 1000 MILLILITER(S): 9 INJECTION INTRAMUSCULAR; INTRAVENOUS; SUBCUTANEOUS at 16:37

## 2024-04-17 RX ADMIN — SIMVASTATIN 20 MILLIGRAM(S): 20 TABLET, FILM COATED ORAL at 23:18

## 2024-04-17 NOTE — ED PROVIDER NOTE - WR ORDER NAME 1
Xray Chest 1 View- PORTABLE-Urgent Assistance OOB with selected safe patient handling equipment/Communicate Fall Risk and Risk Factors to all staff, patient, and family/Discuss with provider need for PT consult/Monitor gait and stability/Provide patient with walking aids - walker, cane, crutches/Reinforce activity limits and safety measures with patient and family/Visual Cue: Yellow wristband/Bed in lowest position, wheels locked, appropriate side rails in place/Call bell, personal items and telephone in reach/Instruct patient to call for assistance before getting out of bed or chair/Non-slip footwear when patient is out of bed/[place] to call system/Physically safe environment - no spills, clutter or unnecessary equipment/Purposeful Proactive Rounding/Room/bathroom lighting operational, light cord in reach Assistance OOB with selected safe patient handling equipment/Communicate Fall Risk and Risk Factors to all staff, patient, and family/Discuss with provider need for PT consult/Monitor gait and stability/Provide patient with walking aids - walker, cane, crutches/Reinforce activity limits and safety measures with patient and family/Visual Cue: Yellow wristband/Bed in lowest position, wheels locked, appropriate side rails in place/Call bell, personal items and telephone in reach/Instruct patient to call for assistance before getting out of bed or chair/Non-slip footwear when patient is out of bed/[place] to call system/Physically safe environment - no spills, clutter or unnecessary equipment/Purposeful Proactive Rounding/Room/bathroom lighting operational, light cord in reach

## 2024-04-17 NOTE — H&P ADULT - NSHPLABSRESULTS_GEN_ALL_CORE
7.5    8.35  )-----------( 232      ( 17 Apr 2024 17:47 )             24.0     144  |  114<H>  |  23  ----------------------------<  144<H>     04-17  5.0   |  26  |  0.96    Ca    8.6      17 Apr 2024 13:00    TPro  6.4  /  Alb  2.7<L>  /  TBili  0.6  /  DBili  x   /  AST  29  /  ALT  17  /  AlkPhos  31<L>  04-17    Chest x-ray 4/17/24  IMPRESSION:  No acute finding or change.    CTA A/P with IV contrast 4/17/24  FINDINGS:  LOWER CHEST: Right middle and right lower lobe blebs. Coronary artery calcification.    LIVER: Too small to characterize hypodensities.  BILE DUCTS: Normal caliber.  GALLBLADDER: Cholecystectomy.  SPLEEN: Within normal limits.  PANCREAS: Within normal limits.  ADRENALS: Within normal limits.  KIDNEYS/URETERS: Right upper and right lower pole renal mass is similar to prior concerning for neoplasm. Additional bilateral renal cysts    BLADDER: Within normal limits.  REPRODUCTIVE ORGANS: Calcified uterine fibroids    BOWEL: No scan evidence of an active GI bleeding focus. Colonic diverticulosis without acute diverticulitis.. Appendix no appendicitis  PERITONEUM: No ascites.  VESSELS: Nonaneurysmal.  RETROPERITONEUM/LYMPH NODES: No lymphadenopathy.  ABDOMINAL WALL: Within normal limits.  BONES: Degenerative changes..    IMPRESSION:  No scan evidence of an active GI bleeding site.    Colonic diverticulosis without acute diverticulitis.

## 2024-04-17 NOTE — H&P ADULT - NSICDXPASTMEDICALHX_GEN_ALL_CORE_FT
PAST MEDICAL HISTORY:  Diabetes mellitus type II, non insulin dependent     Gastritis     HLD (hyperlipidemia)     HTN (hypertension)     Right renal mass

## 2024-04-17 NOTE — H&P ADULT - ASSESSMENT
Eunice Reed is a 68 year old female with PMHx of HTN, HLD, NIDDM2, gastritis, and R. renal masses (recently noted on imaging) who presented to the ED on 4/17/24 for complaints of rectal bleeding and admitted for acute blood loss anemia secondary to lower GI bleed.    Acute blood loss anemia secondary to lower GI bleed  In pt with recent admission for same complaint, found to have diverticular bleeding and received total of 3 U pRBCs  Complaints of dark red blood clots while having bowel movement this AM, total of two episodes  Hgb 7.8 on admission, baseline ~12.5? but hgb 9.5 (on 4/12/24)  Recent EGD (on 4/11/24) with gastritis and hiatal hernia, gastric antrum biopsy negative for H. pylori  Recent colonoscopy (on 4/11/24) with diverticulosis with colonic polyps and internal hemorrhoids, cecum biopsy with colonic mucosa with lymphoid aggregate, sigmoid colon biopsy with lymphoid aggregrate and focal hyperplastic change  CXR unremarkable, CTA A/P without active GI bleed and with colonic diverticulosis without diverticulitis  S/p 1L NS bolus in the ED  NPO at midnight for GI eval, maintenance fluids with  cc/hr started, PTA pantoprazole 40 mg  F/u anemia panel, serial CBCs q6, transfuse for hgb < 7  Pending GI consult - please f/u in AM    Lactic acidosis, resolved  Lactic acid 2.6 on admission, presentation not consistent with acute mesenteric ischemia  S/p 1L NS bolus in the ED      Chronic medical conditions:   HTN: PTA losartan 25 mg  HLD: PTA simvastatin 20 mg  NIDDM2: last known A1c 5.9 (on 4/10/24), POC qac and qhs, PTA metformin 500 mg BID held, low dose SSI qac started, blood glucose goal < 180  R. renal masses: as seen on CTA A/P, also seen on imaging during recent admission, reiterated importance of outpatient f/u with urology and surgery    Medication reconciliation completed using discharge med rec from 4/12/24. Patient reports she has not had any medication changes since then.

## 2024-04-17 NOTE — H&P ADULT - NSHPPHYSICALEXAM_GEN_ALL_CORE
T(C): 36.9 (04-17-24 @ 16:46), Max: 36.9 (04-17-24 @ 11:04)  HR: 94 (04-17-24 @ 16:46) (94 - 122)  BP: 106/60 (04-17-24 @ 16:46) (106/60 - 106/66)  RR: 18 (04-17-24 @ 16:46) (18 - 18)  SpO2: 100% (04-17-24 @ 16:46) (99% - 100%)    CONSTITUTIONAL: Well groomed, no apparent distress, pleasant, conversational  EYES: PERRLA and symmetric, EOMI  ENMT: Oral mucosa with moist membranes  RESP: No respiratory distress, no use of accessory muscles; CTA b/l  CV: RRR  GI: Soft, NT, ND

## 2024-04-17 NOTE — ED ADULT TRIAGE NOTE - CHIEF COMPLAINT QUOTE
pt c/o rectal bleeding, fatigue and dizziness since this morning. pt was seen admitted  on April 9th for diverticulitis and blood transfusion.  recent coloscopy a week ago. denies abdominal pain. history of dm

## 2024-04-17 NOTE — ED ADULT NURSE NOTE - OBJECTIVE STATEMENT
pt is AOX4 present to the ED c/o rectal bleeding, fatigue and dizziness since this morning. pt was seen admitted  on April 9th for diverticulitis and blood transfusion.  recent coloscopy a week ago. denies abdominal pain. history of dm and htn

## 2024-04-17 NOTE — PATIENT PROFILE ADULT - FALL HARM RISK - HARM RISK INTERVENTIONS

## 2024-04-17 NOTE — H&P ADULT - HISTORY OF PRESENT ILLNESS
Eunice Reed is a 68 year old female with PMHx of HTN, HLD, NIDDM2, gastritis, and R. renal masses (recently noted on imaging) who presented to the ED on 4/17/24 for complaints of rectal bleeding.    Patient reports she has not noticed any blood per rectum since discharge until this morning around 10:30 AM. Patient states she had two episodes of dark red blood clots while having a bowel movement. Denies hematuria or hematemesis. Denies consumption of red foods or liquids. No NSAID use. Baseline functional status is ambulates unassisted and independent with all ADLs. Lives at home with son since her  passed away three years ago.    Recent admission from 4/9/24 - 4/12/24 for blood in stool. CTA A/P without active bleed but revealed colonic diverticulosis and two right renal masses. Evaluated by GI and presumed to have diverticular bleed. Received a total of 3 U pRBCs. EGD on 4/11 with gastritis and hiatal hernia. Colonoscopy on 4/11 revealed diverticulosis with colonic polyps and internal hemorrhoids. Gastric antrum biopsy negative for H. pylori. Cecum biopsy with colonic mucosa with lymphoid aggregate. Sigmoid colon biopsy with lymphoid aggregrate and focal hyperplastic change. Course complicated with episode of syncope which was likely vasovagal. Echocardiogram with normal LV EF with trace MR. Orthostatics negative. Evaluated by urology for right renal masses and advised outpatient follow up with surgery. Discharged home.    In the ED, VSS except HR as elevated as 122 and BP as low as 106/60. Hgb 7.8, lactic acid 2.6. CXR unremarkable. CTA A/P without active GI bleed and with colonic diverticulosis without diverticulitis. Received 1L NS bolus. Requested ER physician to consult GI.

## 2024-04-17 NOTE — ED PROVIDER NOTE - NS ED MD DISPO SPECIAL CONSIDERATION1
1) Ice is your friend for the next 2 weeks.  Your knee will swell over the next 48hours and you can expect pain to get a bit worse. Ice your Knee plenty, continuously if possible. Fill up a plastic bag, then wrap it in a towel or pillow case.     2) Elevate your leg above your heart with about 3 pillows when you are in bed or chair. Otherwise you should be up and about as much as possible. No sitting around. The more you move the better you will do. But you can rest too when you need it. Weight bearing as tolerated.    3) Expect some bloody drainage. This is usually  from the leftover fluid put inside you knee during surgery. It is normal. It may even soak through the gauze and ACE bandage and look bloody. Not to worry as this is mostly Saline fluid coming out of your knee leftover from surgery. Even a drop of blood can make it look very bloody. Simply place another Gauze and another ACE over it and wrap it snug but not too tight. If it bleeds through the second bandage again, you can call.    4) Remove bandage in 48hrs and place waterproof band aides. Can shower in 48 hours. No bath. Pat your incisions dry. No creams, no lotions.    5) Only reason to worry would be if pain got so severe that you cannot feel or wiggle your toes, or if your foot is cold. In this case you need to call and come to the ER. But as  long as you can feel and wiggle your toes, you should be fine.    6) Make sure you call the office to schedule a follow up appointment to be seen in 10-14 days. Your Sutures will be removed at that point.    7) A pain Rx is in the chart; fill it on your way home. OR was sent electronically to your pharmacy, pick it up on the way home.
None

## 2024-04-17 NOTE — ED PROVIDER NOTE - CLINICAL SUMMARY MEDICAL DECISION MAKING FREE TEXT BOX
68F PMH HTN, DM 68F PMH HTN, HLD, DM, s/p recent admission for GIB pw rectal bleeding, lightheadedness onset this AM. Afebrile, VSS. Pt overall well appearing, in NAD. Plan for CBC, CMP, T&S, coags, CTA AP. 68F PMH HTN, HLD, DM, s/p recent admission for GIB pw rectal bleeding, lightheadedness onset this AM. Afebrile, VSS. Pt overall well appearing, in NAD. Plan for CBC, CMP, lactate, lipase, T&S, coags, CXR, CTA AP, ECG. Re-eval. 68F PMH HTN, HLD, DM, s/p recent admission for GIB pw rectal bleeding, lightheadedness onset this AM. Afebrile, VSS. Pt overall well appearing, in NAD. Plan for CBC, CMP, lactate, lipase, T&S, coags, CXR, CTA AP, ECG. Re-eval.  W/u significant for: Hgb 7.8 -> 7.5 (Hgb 9.5 on 4/10), CTA w/o active GIB, + diverticulosis. On re-eval, pt resting comfortably. Pt w/ 2 additional bloody BMs since ED arrival. Will admit to Tele (d/w Dr Orellana), GI (Dr Ferreira) consulted. Pt, sister updated to results, admission. They understand / agree w/ this plan. 68F PMH HTN, HLD, DM, s/p recent admission for GIB pw rectal bleeding, lightheadedness onset this AM. Afebrile, VSS. Pt overall well appearing, in NAD. Plan for CBC, CMP, lactate, lipase, T&S, coags, CXR, CTA AP, ECG. Re-eval.  W/u significant for: Hgb 7.8 -> 7.5 (Hgb 9.5 on 4/10), lactate 2.6, CTA w/o active GIB, + diverticulosis. On re-eval, pt resting comfortably. Pt w/ 2 additional bloody BMs since ED arrival. Will admit to Tele (d/w Dr Orellana), GI (Dr Ferreira) consulted. Pt, sister updated to results, admission. They understand / agree w/ this plan.

## 2024-04-17 NOTE — CONSULT NOTE ADULT - SUBJECTIVE AND OBJECTIVE BOX
Full consult dictated    Plan:  67 yo F with multi[ple medical problems including HTN, HLD, NIDDM2, gastritis, and R. renal masses --> s/p recent GI bleed and colonoscopy which revealed extensive diverticulosis with probable R sided diverticular bleed. Pt did not bleed from discharge on 4/12 until earlier today when she noted BRBPR --> pt had 2 episodes of dark red blood with clots.  Pt came to ER and was found to have Hgb 7.8.  CTA A/P without active GI bleed and with colonic diverticulosis without diverticulitis. Pt has been admitted for observation. Will follow H/H; start clear liquids.

## 2024-04-17 NOTE — ED ADULT NURSE REASSESSMENT NOTE - NS ED NURSE REASSESS COMMENT FT1
patient with no new c/o rectal bleeding or abdominal pains. aware she is admitted for further intervention.

## 2024-04-17 NOTE — ED PROVIDER NOTE - OBJECTIVE STATEMENT
68F HTN, DM 68F HTN, HLD, DM pw rectal bleeding onset this AM. Pt w/ photo of mixed dark and bright red stool. + mild lightheadedness. Denies F/C, h/a, dizziness, CP, SOB, cough, abd pain, back pain, N/V/D/C, UTI sx. Pt w/ hx recent admission for GIB, pt reports transfused 2u PRBC for Hgb < 6.0, started on PPIs. Pt w/ endoscopy / colonoscopy performed outpatient last Thursday, w/ + polyp removal and diagnosis diverticulosis, hemorrhoids.     PMH as above, PSH anish, , Allergy PCN, Meds as listed, negative social hx. 68F HTN, HLD, DM pw rectal bleeding onset this AM. Pt w/ photo of black colored stool w/ bright red toilet water. + mild lightheadedness. Denies F/C, h/a, dizziness, CP, SOB, cough, abd pain, back pain, N/V/D/C, UTI sx. Pt w/ hx recent admission for diverticular GIB, pt reports transfused 3u PRBC for Hgb < 6.0, started on PPIs. Pt w/ endoscopy / colonoscopy performed inpatient last Thursday, w/ + polyp removal and diagnosis diverticulosis, hemorrhoids.     PMH as above, PSH anish, , Allergy PCN, Meds as listed, negative social hx. GI Dr Ferreira.

## 2024-04-17 NOTE — PATIENT PROFILE ADULT - FUNCTIONAL ASSESSMENT - BASIC MOBILITY ASSESSMENT TYPE
Use Prelone to help decrease inflammation and help with cough    Use Bromfed for cough congestion this will make the patient thirsty this is normal push clear liquid    Have the child reseen by the pediatrician in 3 to 5 days if not improving or return to the ER if worse   Admission

## 2024-04-18 DIAGNOSIS — K57.32 DIVERTICULITIS OF LARGE INTESTINE WITHOUT PERFORATION OR ABSCESS WITHOUT BLEEDING: ICD-10-CM

## 2024-04-18 DIAGNOSIS — R55 SYNCOPE AND COLLAPSE: ICD-10-CM

## 2024-04-18 DIAGNOSIS — K92.2 GASTROINTESTINAL HEMORRHAGE, UNSPECIFIED: ICD-10-CM

## 2024-04-18 DIAGNOSIS — K64.8 OTHER HEMORRHOIDS: ICD-10-CM

## 2024-04-18 DIAGNOSIS — N28.89 OTHER SPECIFIED DISORDERS OF KIDNEY AND URETER: ICD-10-CM

## 2024-04-18 DIAGNOSIS — K29.70 GASTRITIS, UNSPECIFIED, WITHOUT BLEEDING: ICD-10-CM

## 2024-04-18 DIAGNOSIS — K44.9 DIAPHRAGMATIC HERNIA WITHOUT OBSTRUCTION OR GANGRENE: ICD-10-CM

## 2024-04-18 DIAGNOSIS — K63.5 POLYP OF COLON: ICD-10-CM

## 2024-04-18 DIAGNOSIS — Z79.84 LONG TERM (CURRENT) USE OF ORAL HYPOGLYCEMIC DRUGS: ICD-10-CM

## 2024-04-18 DIAGNOSIS — E11.9 TYPE 2 DIABETES MELLITUS WITHOUT COMPLICATIONS: ICD-10-CM

## 2024-04-18 DIAGNOSIS — D62 ACUTE POSTHEMORRHAGIC ANEMIA: ICD-10-CM

## 2024-04-18 DIAGNOSIS — E78.5 HYPERLIPIDEMIA, UNSPECIFIED: ICD-10-CM

## 2024-04-18 DIAGNOSIS — I10 ESSENTIAL (PRIMARY) HYPERTENSION: ICD-10-CM

## 2024-04-18 LAB
ANISOCYTOSIS BLD QL: SLIGHT — SIGNIFICANT CHANGE UP
FERRITIN SERPL-MCNC: 20 NG/ML — SIGNIFICANT CHANGE UP (ref 13–330)
FOLATE SERPL-MCNC: 6 NG/ML — SIGNIFICANT CHANGE UP
GLUCOSE BLDC GLUCOMTR-MCNC: 100 MG/DL — HIGH (ref 70–99)
GLUCOSE BLDC GLUCOMTR-MCNC: 156 MG/DL — HIGH (ref 70–99)
GLUCOSE BLDC GLUCOMTR-MCNC: 174 MG/DL — HIGH (ref 70–99)
GLUCOSE BLDC GLUCOMTR-MCNC: 78 MG/DL — SIGNIFICANT CHANGE UP (ref 70–99)
HCT VFR BLD CALC: 22.8 % — LOW (ref 34.5–45)
HCT VFR BLD CALC: 24.3 % — LOW (ref 34.5–45)
HCT VFR BLD CALC: 24.9 % — LOW (ref 34.5–45)
HGB BLD-MCNC: 7 G/DL — CRITICAL LOW (ref 11.5–15.5)
HGB BLD-MCNC: 7.7 G/DL — LOW (ref 11.5–15.5)
HGB BLD-MCNC: 7.8 G/DL — LOW (ref 11.5–15.5)
HYPOCHROMIA BLD QL: SLIGHT — SIGNIFICANT CHANGE UP
IRON SATN MFR SERPL: 51 UG/DL — SIGNIFICANT CHANGE UP (ref 30–160)
MANUAL SMEAR VERIFICATION: SIGNIFICANT CHANGE UP
MCHC RBC-ENTMCNC: 25.3 PG — LOW (ref 27–34)
MCHC RBC-ENTMCNC: 30.7 G/DL — LOW (ref 32–36)
MCHC RBC-ENTMCNC: 31.3 G/DL — LOW (ref 32–36)
MCHC RBC-ENTMCNC: 31.7 G/DL — LOW (ref 32–36)
MCV RBC AUTO: 79.9 FL — LOW (ref 80–100)
MCV RBC AUTO: 80.8 FL — SIGNIFICANT CHANGE UP (ref 80–100)
MCV RBC AUTO: 82.3 FL — SIGNIFICANT CHANGE UP (ref 80–100)
MICROCYTES BLD QL: SLIGHT — SIGNIFICANT CHANGE UP
NRBC # BLD: 0 /100 WBCS — SIGNIFICANT CHANGE UP (ref 0–0)
OVALOCYTES BLD QL SMEAR: SLIGHT — SIGNIFICANT CHANGE UP
PLAT MORPH BLD: NORMAL — SIGNIFICANT CHANGE UP
PLATELET # BLD AUTO: 201 K/UL — SIGNIFICANT CHANGE UP (ref 150–400)
PLATELET # BLD AUTO: 217 K/UL — SIGNIFICANT CHANGE UP (ref 150–400)
PLATELET # BLD AUTO: 224 K/UL — SIGNIFICANT CHANGE UP (ref 150–400)
POIKILOCYTOSIS BLD QL AUTO: SLIGHT — SIGNIFICANT CHANGE UP
RBC # BLD: 2.77 M/UL — LOW (ref 3.8–5.2)
RBC # BLD: 3.04 M/UL — LOW (ref 3.8–5.2)
RBC # BLD: 3.08 M/UL — LOW (ref 3.8–5.2)
RBC # FLD: 16.8 % — HIGH (ref 10.3–14.5)
RBC # FLD: 16.9 % — HIGH (ref 10.3–14.5)
RBC # FLD: 16.9 % — HIGH (ref 10.3–14.5)
RBC BLD AUTO: ABNORMAL
VIT B12 SERPL-MCNC: 420 PG/ML — SIGNIFICANT CHANGE UP (ref 232–1245)
WBC # BLD: 7.32 K/UL — SIGNIFICANT CHANGE UP (ref 3.8–10.5)
WBC # BLD: 7.4 K/UL — SIGNIFICANT CHANGE UP (ref 3.8–10.5)
WBC # BLD: 9.34 K/UL — SIGNIFICANT CHANGE UP (ref 3.8–10.5)
WBC # FLD AUTO: 7.32 K/UL — SIGNIFICANT CHANGE UP (ref 3.8–10.5)
WBC # FLD AUTO: 7.4 K/UL — SIGNIFICANT CHANGE UP (ref 3.8–10.5)
WBC # FLD AUTO: 9.34 K/UL — SIGNIFICANT CHANGE UP (ref 3.8–10.5)

## 2024-04-18 PROCEDURE — 99232 SBSQ HOSP IP/OBS MODERATE 35: CPT

## 2024-04-18 PROCEDURE — 36800 INSERTION OF CANNULA: CPT

## 2024-04-18 PROCEDURE — 76937 US GUIDE VASCULAR ACCESS: CPT | Mod: 26,59

## 2024-04-18 PROCEDURE — 36000 PLACE NEEDLE IN VEIN: CPT | Mod: 59

## 2024-04-18 RX ADMIN — PANTOPRAZOLE SODIUM 40 MILLIGRAM(S): 20 TABLET, DELAYED RELEASE ORAL at 05:36

## 2024-04-18 RX ADMIN — SIMVASTATIN 20 MILLIGRAM(S): 20 TABLET, FILM COATED ORAL at 22:10

## 2024-04-18 RX ADMIN — Medication 1: at 11:55

## 2024-04-18 RX ADMIN — Medication 1: at 18:07

## 2024-04-18 RX ADMIN — LOSARTAN POTASSIUM 25 MILLIGRAM(S): 100 TABLET, FILM COATED ORAL at 05:36

## 2024-04-18 NOTE — PROGRESS NOTE ADULT - ASSESSMENT
Eunice Reed is a 68 year old female with PMHx of HTN, HLD, NIDDM2, gastritis, and R. renal masses (recently noted on imaging) who presented to the ED on 4/17/24 for complaints of rectal bleeding and admitted for acute blood loss anemia secondary to lower GI bleed.    Acute blood loss anemia secondary to lower GI bleed  In pt with recent admission for same complaint, found to have diverticular bleeding and received total of 3 U pRBCs  Complaints of dark red blood clots while having bowel movement this AM, total of two episodes  Hgb 7.8 on admission, baseline ~12.5? but hgb 9.5 (on 4/12/24)  Recent EGD (on 4/11/24) with gastritis and hiatal hernia, gastric antrum biopsy negative for H. pylori  Recent colonoscopy (on 4/11/24) with diverticulosis with colonic polyps and internal hemorrhoids, cecum biopsy with colonic mucosa with lymphoid aggregate, sigmoid colon biopsy with lymphoid aggregrate and focal hyperplastic change  CXR unremarkable, CTA A/P without active GI bleed and with colonic diverticulosis without diverticulitis  S/p 1L NS bolus in the ED   PTA pantoprazole 40 mg  Appreciate GI recs- started CLD. Monitor H/H    Lactic acidosis, resolved  Lactic acid 2.6 on admission, presentation not consistent with acute mesenteric ischemia  S/p 1L NS bolus in the ED    Chronic medical conditions:   HTN: PTA losartan 25 mg  HLD: PTA simvastatin 20 mg  NIDDM2: last known A1c 5.9 (on 4/10/24), POC qac and qhs, PTA metformin 500 mg BID held, low dose SSI qac started, blood glucose goal < 180  R. renal masses: as seen on CTA A/P, also seen on imaging during recent admission, reiterated importance of outpatient f/u with urology and surgery

## 2024-04-18 NOTE — CHART NOTE - NSCHARTNOTEFT_GEN_A_CORE
Discussed with patient at bedside regarding the need for blood transfusion. Risk and benefits discussed. Risks including fever, chills/rigors, high or low blood pressure, respiratory distress (Wheezing/hypoxia), Urticaria/Rash/Edema, Nausea, Pain, Bleeding, darkened urine, lower back pain, severe allergic reaction and death discussed. Verbalizes the understanding and consent Obtained. Witnessed by ZELDA Oconnor. Consent placed in chart.

## 2024-04-18 NOTE — PROCEDURE NOTE - NSPROCDETAILS_GEN_ALL_CORE
US guided IV/blood seen on insertion/dressing applied/flushes easily/secured in place/sterile technique, catheter placed

## 2024-04-19 LAB
ALBUMIN SERPL ELPH-MCNC: 2.7 G/DL — LOW (ref 3.3–5)
ALP SERPL-CCNC: 33 U/L — LOW (ref 40–120)
ALT FLD-CCNC: 12 U/L — SIGNIFICANT CHANGE UP (ref 12–78)
ANION GAP SERPL CALC-SCNC: 4 MMOL/L — LOW (ref 5–17)
AST SERPL-CCNC: 15 U/L — SIGNIFICANT CHANGE UP (ref 15–37)
BILIRUB SERPL-MCNC: 0.6 MG/DL — SIGNIFICANT CHANGE UP (ref 0.2–1.2)
BUN SERPL-MCNC: 6 MG/DL — LOW (ref 7–23)
CALCIUM SERPL-MCNC: 8.7 MG/DL — SIGNIFICANT CHANGE UP (ref 8.5–10.1)
CHLORIDE SERPL-SCNC: 112 MMOL/L — HIGH (ref 96–108)
CO2 SERPL-SCNC: 27 MMOL/L — SIGNIFICANT CHANGE UP (ref 22–31)
CREAT SERPL-MCNC: 0.76 MG/DL — SIGNIFICANT CHANGE UP (ref 0.5–1.3)
EGFR: 84 ML/MIN/1.73M2 — SIGNIFICANT CHANGE UP
GLUCOSE BLDC GLUCOMTR-MCNC: 103 MG/DL — HIGH (ref 70–99)
GLUCOSE BLDC GLUCOMTR-MCNC: 111 MG/DL — HIGH (ref 70–99)
GLUCOSE BLDC GLUCOMTR-MCNC: 142 MG/DL — HIGH (ref 70–99)
GLUCOSE SERPL-MCNC: 101 MG/DL — HIGH (ref 70–99)
HCT VFR BLD CALC: 25 % — LOW (ref 34.5–45)
HCT VFR BLD CALC: 25.7 % — LOW (ref 34.5–45)
HCT VFR BLD CALC: 26.5 % — LOW (ref 34.5–45)
HGB BLD-MCNC: 7.8 G/DL — LOW (ref 11.5–15.5)
HGB BLD-MCNC: 8.2 G/DL — LOW (ref 11.5–15.5)
HGB BLD-MCNC: 8.3 G/DL — LOW (ref 11.5–15.5)
MCHC RBC-ENTMCNC: 25.2 PG — LOW (ref 27–34)
MCHC RBC-ENTMCNC: 25.2 PG — LOW (ref 27–34)
MCHC RBC-ENTMCNC: 26.1 PG — LOW (ref 27–34)
MCHC RBC-ENTMCNC: 30.9 G/DL — LOW (ref 32–36)
MCHC RBC-ENTMCNC: 31.2 G/DL — LOW (ref 32–36)
MCHC RBC-ENTMCNC: 32.3 G/DL — SIGNIFICANT CHANGE UP (ref 32–36)
MCV RBC AUTO: 80.6 FL — SIGNIFICANT CHANGE UP (ref 80–100)
MCV RBC AUTO: 80.8 FL — SIGNIFICANT CHANGE UP (ref 80–100)
MCV RBC AUTO: 81.3 FL — SIGNIFICANT CHANGE UP (ref 80–100)
NRBC # BLD: 0 /100 WBCS — SIGNIFICANT CHANGE UP (ref 0–0)
PLATELET # BLD AUTO: 208 K/UL — SIGNIFICANT CHANGE UP (ref 150–400)
PLATELET # BLD AUTO: 225 K/UL — SIGNIFICANT CHANGE UP (ref 150–400)
PLATELET # BLD AUTO: 228 K/UL — SIGNIFICANT CHANGE UP (ref 150–400)
POTASSIUM SERPL-MCNC: 4 MMOL/L — SIGNIFICANT CHANGE UP (ref 3.5–5.3)
POTASSIUM SERPL-SCNC: 4 MMOL/L — SIGNIFICANT CHANGE UP (ref 3.5–5.3)
PROT SERPL-MCNC: 5.9 GM/DL — LOW (ref 6–8.3)
RBC # BLD: 3.1 M/UL — LOW (ref 3.8–5.2)
RBC # BLD: 3.18 M/UL — LOW (ref 3.8–5.2)
RBC # BLD: 3.26 M/UL — LOW (ref 3.8–5.2)
RBC # FLD: 16.7 % — HIGH (ref 10.3–14.5)
RBC # FLD: 16.7 % — HIGH (ref 10.3–14.5)
RBC # FLD: 16.9 % — HIGH (ref 10.3–14.5)
SODIUM SERPL-SCNC: 143 MMOL/L — SIGNIFICANT CHANGE UP (ref 135–145)
WBC # BLD: 6.41 K/UL — SIGNIFICANT CHANGE UP (ref 3.8–10.5)
WBC # BLD: 6.99 K/UL — SIGNIFICANT CHANGE UP (ref 3.8–10.5)
WBC # BLD: 7.19 K/UL — SIGNIFICANT CHANGE UP (ref 3.8–10.5)
WBC # FLD AUTO: 6.41 K/UL — SIGNIFICANT CHANGE UP (ref 3.8–10.5)
WBC # FLD AUTO: 6.99 K/UL — SIGNIFICANT CHANGE UP (ref 3.8–10.5)
WBC # FLD AUTO: 7.19 K/UL — SIGNIFICANT CHANGE UP (ref 3.8–10.5)

## 2024-04-19 PROCEDURE — 99232 SBSQ HOSP IP/OBS MODERATE 35: CPT

## 2024-04-19 RX ADMIN — SIMVASTATIN 20 MILLIGRAM(S): 20 TABLET, FILM COATED ORAL at 22:06

## 2024-04-19 RX ADMIN — LOSARTAN POTASSIUM 25 MILLIGRAM(S): 100 TABLET, FILM COATED ORAL at 05:29

## 2024-04-19 RX ADMIN — PANTOPRAZOLE SODIUM 40 MILLIGRAM(S): 20 TABLET, DELAYED RELEASE ORAL at 05:29

## 2024-04-19 NOTE — DIETITIAN INITIAL EVALUATION ADULT - ORAL INTAKE PTA/DIET HISTORY
Pt reports depressed appetite since the death of her spouse 3 years ago.  Per pt., she eats when she is hungry.  Diet PTA: diabetic/CHO controlled, low sodium.  Food allergy to strawberry noted.  Pt without report of altered chewing/swallowing.

## 2024-04-19 NOTE — PROGRESS NOTE ADULT - ASSESSMENT
Eunice Reed is a 68 year old female with PMHx of HTN, HLD, NIDDM2, gastritis, and R. renal masses (recently noted on imaging) who presented to the ED on 4/17/24 for complaints of rectal bleeding and admitted for acute blood loss anemia secondary to lower GI bleed.    Acute blood loss anemia secondary to lower GI bleed  In pt with recent admission for same complaint, found to have diverticular bleeding and received total of 3 U pRBCs  Complaints of dark red blood clots while having bowel movement this AM, total of two episodes  Hgb 7.8 on admission, baseline ~12.5? but hgb 9.5 (on 4/12/24)  Recent EGD (on 4/11/24) with gastritis and hiatal hernia, gastric antrum biopsy negative for H. pylori  Recent colonoscopy (on 4/11/24) with diverticulosis with colonic polyps and internal hemorrhoids, cecum biopsy with colonic mucosa with lymphoid aggregate, sigmoid colon biopsy with lymphoid aggregrate and focal hyperplastic change  CXR unremarkable, CTA A/P without active GI bleed and with colonic diverticulosis without diverticulitis  S/p 1L NS bolus in the ED   PTA pantoprazole 40 mg  - Appreciate GI recs- Monitor H/H  - Advanced to regular diet fiber/residue restricted    Lactic acidosis, resolved  Lactic acid 2.6 on admission, presentation not consistent with acute mesenteric ischemia  S/p 1L NS bolus in the ED    Chronic medical conditions:   HTN: PTA losartan 25 mg  HLD: PTA simvastatin 20 mg  NIDDM2: last known A1c 5.9 (on 4/10/24), POC qac and qhs, PTA metformin 500 mg BID held, low dose SSI qac started, blood glucose goal < 180  R. renal masses: as seen on CTA A/P, also seen on imaging during recent admission, reiterated importance of outpatient f/u with urology and surgery

## 2024-04-19 NOTE — DIETITIAN INITIAL EVALUATION ADULT - PERTINENT LABORATORY DATA
04-19    143  |  112<H>  |  6<L>  ----------------------------<  101<H>  4.0   |  27  |  0.76    Ca    8.7      19 Apr 2024 06:57    TPro  5.9<L>  /  Alb  2.7<L>  /  TBili  0.6  /  DBili  x   /  AST  15  /  ALT  12  /  AlkPhos  33<L>  04-19  POCT Blood Glucose.: 142 mg/dL (04-19-24 @ 16:23)  A1C with Estimated Average Glucose Result: 5.9 %<H> (04-10-24 @ 06:30)

## 2024-04-19 NOTE — DIETITIAN INITIAL EVALUATION ADULT - % CHANGE
-Returned call to mom but no answer  -LVM with clinic number for c/b  -Offered to have her leave a time when she would be available to talk if writer was not available when she called back   14.5

## 2024-04-19 NOTE — DIETITIAN INITIAL EVALUATION ADULT - OTHER INFO
Pt was on metformin for diabetes, last A1C%=6.1%.  Per pt., she self monitored blood glucose 1-2 x day & didn't take metformin if glucose was under 100 as she didn't want her blood sugar to go low.  Pt provided education on low fiber diet for diverticulitis, low sodium , consistent CHO intake encouraged.  Encouraged adequate protein-energy intake, consumption of 6 small meals & trial of nutritional supplement for diabetes to encourage adequate protein-energy intake.

## 2024-04-19 NOTE — DIETITIAN INITIAL EVALUATION ADULT - PERTINENT MEDS FT
MEDICATIONS  (STANDING):  dextrose 10% Bolus 125 milliLiter(s) IV Bolus once  dextrose 5%. 1000 milliLiter(s) (50 mL/Hr) IV Continuous <Continuous>  dextrose 5%. 1000 milliLiter(s) (100 mL/Hr) IV Continuous <Continuous>  dextrose 50% Injectable 12.5 Gram(s) IV Push once  dextrose 50% Injectable 25 Gram(s) IV Push once  glucagon  Injectable 1 milliGRAM(s) IntraMuscular once  insulin lispro (ADMELOG) corrective regimen sliding scale   SubCutaneous three times a day before meals  losartan 25 milliGRAM(s) Oral daily  pantoprazole    Tablet 40 milliGRAM(s) Oral before breakfast  simvastatin 20 milliGRAM(s) Oral at bedtime  sodium chloride 0.9%. 1000 milliLiter(s) (100 mL/Hr) IV Continuous <Continuous>    MEDICATIONS  (PRN):  acetaminophen     Tablet .. 650 milliGRAM(s) Oral every 6 hours PRN Temp greater or equal to 38C (100.4F), Mild Pain (1 - 3)  aluminum hydroxide/magnesium hydroxide/simethicone Suspension 30 milliLiter(s) Oral every 4 hours PRN Dyspepsia  dextrose Oral Gel 15 Gram(s) Oral once PRN Blood Glucose LESS THAN 70 milliGRAM(s)/deciliter  melatonin 3 milliGRAM(s) Oral at bedtime PRN Insomnia  ondansetron Injectable 4 milliGRAM(s) IV Push every 8 hours PRN Nausea and/or Vomiting

## 2024-04-20 ENCOUNTER — TRANSCRIPTION ENCOUNTER (OUTPATIENT)
Age: 69
End: 2024-04-20

## 2024-04-20 VITALS
SYSTOLIC BLOOD PRESSURE: 117 MMHG | OXYGEN SATURATION: 96 % | HEART RATE: 90 BPM | DIASTOLIC BLOOD PRESSURE: 75 MMHG | RESPIRATION RATE: 18 BRPM | TEMPERATURE: 99 F

## 2024-04-20 LAB
ALBUMIN SERPL ELPH-MCNC: 2.6 G/DL — LOW (ref 3.3–5)
ALP SERPL-CCNC: 35 U/L — LOW (ref 40–120)
ALT FLD-CCNC: 13 U/L — SIGNIFICANT CHANGE UP (ref 12–78)
ANION GAP SERPL CALC-SCNC: 8 MMOL/L — SIGNIFICANT CHANGE UP (ref 5–17)
AST SERPL-CCNC: 18 U/L — SIGNIFICANT CHANGE UP (ref 15–37)
BILIRUB SERPL-MCNC: 0.7 MG/DL — SIGNIFICANT CHANGE UP (ref 0.2–1.2)
BUN SERPL-MCNC: 10 MG/DL — SIGNIFICANT CHANGE UP (ref 7–23)
CALCIUM SERPL-MCNC: 8.6 MG/DL — SIGNIFICANT CHANGE UP (ref 8.5–10.1)
CHLORIDE SERPL-SCNC: 112 MMOL/L — HIGH (ref 96–108)
CO2 SERPL-SCNC: 26 MMOL/L — SIGNIFICANT CHANGE UP (ref 22–31)
CREAT SERPL-MCNC: 0.74 MG/DL — SIGNIFICANT CHANGE UP (ref 0.5–1.3)
EGFR: 87 ML/MIN/1.73M2 — SIGNIFICANT CHANGE UP
GLUCOSE BLDC GLUCOMTR-MCNC: 111 MG/DL — HIGH (ref 70–99)
GLUCOSE BLDC GLUCOMTR-MCNC: 183 MG/DL — HIGH (ref 70–99)
GLUCOSE SERPL-MCNC: 92 MG/DL — SIGNIFICANT CHANGE UP (ref 70–99)
HCT VFR BLD CALC: 26.1 % — LOW (ref 34.5–45)
HGB BLD-MCNC: 8.1 G/DL — LOW (ref 11.5–15.5)
MCHC RBC-ENTMCNC: 25.2 PG — LOW (ref 27–34)
MCHC RBC-ENTMCNC: 31 G/DL — LOW (ref 32–36)
MCV RBC AUTO: 81.3 FL — SIGNIFICANT CHANGE UP (ref 80–100)
NRBC # BLD: 0 /100 WBCS — SIGNIFICANT CHANGE UP (ref 0–0)
PLATELET # BLD AUTO: 235 K/UL — SIGNIFICANT CHANGE UP (ref 150–400)
POTASSIUM SERPL-MCNC: 3.7 MMOL/L — SIGNIFICANT CHANGE UP (ref 3.5–5.3)
POTASSIUM SERPL-SCNC: 3.7 MMOL/L — SIGNIFICANT CHANGE UP (ref 3.5–5.3)
PROT SERPL-MCNC: 5.8 GM/DL — LOW (ref 6–8.3)
RBC # BLD: 3.21 M/UL — LOW (ref 3.8–5.2)
RBC # FLD: 16.5 % — HIGH (ref 10.3–14.5)
SODIUM SERPL-SCNC: 146 MMOL/L — HIGH (ref 135–145)
WBC # BLD: 7.98 K/UL — SIGNIFICANT CHANGE UP (ref 3.8–10.5)
WBC # FLD AUTO: 7.98 K/UL — SIGNIFICANT CHANGE UP (ref 3.8–10.5)

## 2024-04-20 PROCEDURE — 99239 HOSP IP/OBS DSCHRG MGMT >30: CPT

## 2024-04-20 RX ORDER — SIMVASTATIN 20 MG/1
1 TABLET, FILM COATED ORAL
Refills: 0 | DISCHARGE

## 2024-04-20 RX ORDER — LOSARTAN POTASSIUM 100 MG/1
1 TABLET, FILM COATED ORAL
Qty: 0 | Refills: 0 | DISCHARGE
Start: 2024-04-20

## 2024-04-20 RX ORDER — LOSARTAN POTASSIUM 100 MG/1
1 TABLET, FILM COATED ORAL
Refills: 0 | DISCHARGE

## 2024-04-20 RX ORDER — SIMVASTATIN 20 MG/1
1 TABLET, FILM COATED ORAL
Qty: 0 | Refills: 0 | DISCHARGE
Start: 2024-04-20

## 2024-04-20 RX ORDER — PANTOPRAZOLE SODIUM 20 MG/1
1 TABLET, DELAYED RELEASE ORAL
Qty: 0 | Refills: 0 | DISCHARGE
Start: 2024-04-20

## 2024-04-20 RX ADMIN — PANTOPRAZOLE SODIUM 40 MILLIGRAM(S): 20 TABLET, DELAYED RELEASE ORAL at 06:05

## 2024-04-20 RX ADMIN — LOSARTAN POTASSIUM 25 MILLIGRAM(S): 100 TABLET, FILM COATED ORAL at 06:05

## 2024-04-20 RX ADMIN — Medication 1: at 11:17

## 2024-04-20 NOTE — PROGRESS NOTE ADULT - REASON FOR ADMISSION
Acute blood loss anemia secondary to lower GI bleed

## 2024-04-20 NOTE — DISCHARGE NOTE PROVIDER - NSDCMRMEDTOKEN_GEN_ALL_CORE_FT
losartan 25 mg oral tablet: 1 tab(s) orally once a day  MetFORMIN (Eqv-Glucophage XR) 500 mg oral tablet, extended release: 2 tab(s) orally once a day  pantoprazole 40 mg oral delayed release tablet: 1 tab(s) orally once a day (before a meal)  simvastatin 20 mg oral tablet: 1 tab(s) orally once a day (at bedtime)

## 2024-04-20 NOTE — PROGRESS NOTE ADULT - SUBJECTIVE AND OBJECTIVE BOX
Patient is a 68y old  Female who presents with a chief complaint of Acute blood loss anemia secondary to lower GI bleed (17 Apr 2024 22:23)    INTERVAL HPI/OVERNIGHT EVENTS: No acute events overnight. HD stable.     MEDICATIONS  (STANDING):  dextrose 10% Bolus 125 milliLiter(s) IV Bolus once  dextrose 5%. 1000 milliLiter(s) (50 mL/Hr) IV Continuous <Continuous>  dextrose 5%. 1000 milliLiter(s) (100 mL/Hr) IV Continuous <Continuous>  dextrose 50% Injectable 12.5 Gram(s) IV Push once  dextrose 50% Injectable 25 Gram(s) IV Push once  glucagon  Injectable 1 milliGRAM(s) IntraMuscular once  insulin lispro (ADMELOG) corrective regimen sliding scale   SubCutaneous three times a day before meals  losartan 25 milliGRAM(s) Oral daily  pantoprazole    Tablet 40 milliGRAM(s) Oral before breakfast  simvastatin 20 milliGRAM(s) Oral at bedtime  sodium chloride 0.9%. 1000 milliLiter(s) (100 mL/Hr) IV Continuous <Continuous>    MEDICATIONS  (PRN):  acetaminophen     Tablet .. 650 milliGRAM(s) Oral every 6 hours PRN Temp greater or equal to 38C (100.4F), Mild Pain (1 - 3)  aluminum hydroxide/magnesium hydroxide/simethicone Suspension 30 milliLiter(s) Oral every 4 hours PRN Dyspepsia  dextrose Oral Gel 15 Gram(s) Oral once PRN Blood Glucose LESS THAN 70 milliGRAM(s)/deciliter  melatonin 3 milliGRAM(s) Oral at bedtime PRN Insomnia  ondansetron Injectable 4 milliGRAM(s) IV Push every 8 hours PRN Nausea and/or Vomiting      Allergies    No Known Drug Allergies  strawberry (Anaphylaxis)    Intolerances        REVIEW OF SYSTEMS: all negative with exception of above    Vital Signs Last 24 Hrs  T(C): 37.3 (18 Apr 2024 11:06), Max: 37.3 (18 Apr 2024 11:06)  T(F): 99.1 (18 Apr 2024 11:06), Max: 99.1 (18 Apr 2024 11:06)  HR: 93 (18 Apr 2024 11:06) (91 - 110)  BP: 147/67 (18 Apr 2024 11:06) (106/60 - 147/67)  BP(mean): --  RR: 18 (18 Apr 2024 11:06) (18 - 18)  SpO2: 98% (18 Apr 2024 11:06) (98% - 100%)    Parameters below as of 18 Apr 2024 05:45  Patient On (Oxygen Delivery Method): room air    PHYSICAL EXAM:  CONSTITUTIONAL: Well groomed, no apparent distress, pleasant, conversational  EYES: PERRLA and symmetric, EOMI  ENMT: Oral mucosa with moist membranes  RESP: No respiratory distress, no use of accessory muscles; CTA b/l  CV: RRR  GI: Soft, NT, ND    LABS:                        7.8    7.32  )-----------( 217      ( 18 Apr 2024 06:25 )             24.9     04-17    144  |  114<H>  |  23  ----------------------------<  144<H>  5.0   |  26  |  0.96    Ca    8.6      17 Apr 2024 13:00    TPro  6.4  /  Alb  2.7<L>  /  TBili  0.6  /  DBili  x   /  AST  29  /  ALT  17  /  AlkPhos  31<L>  04-17      Urinalysis Basic - ( 17 Apr 2024 13:00 )    Color: x / Appearance: x / SG: x / pH: x  Gluc: 144 mg/dL / Ketone: x  / Bili: x / Urobili: x   Blood: x / Protein: x / Nitrite: x   Leuk Esterase: x / RBC: x / WBC x   Sq Epi: x / Non Sq Epi: x / Bacteria: x      CAPILLARY BLOOD GLUCOSE      POCT Blood Glucose.: 174 mg/dL (18 Apr 2024 11:15)  POCT Blood Glucose.: 100 mg/dL (18 Apr 2024 08:51)  POCT Blood Glucose.: 152 mg/dL (17 Apr 2024 22:11)      RADIOLOGY & ADDITIONAL TESTS:    Imaging Personally Reviewed:  [ ] YES  [ ] NO    Consultant(s) Notes Reviewed:  [ ] YES  [ ] NO    Care Discussed with Consultants/Other Providers [ ] YES  [ ] NO
Pt stable  Pt saw a small amount of blood on toilet tissue  No active bleeding noted      MEDICATIONS  (STANDING):  dextrose 10% Bolus 125 milliLiter(s) IV Bolus once  dextrose 5%. 1000 milliLiter(s) (50 mL/Hr) IV Continuous <Continuous>  dextrose 5%. 1000 milliLiter(s) (100 mL/Hr) IV Continuous <Continuous>  dextrose 50% Injectable 12.5 Gram(s) IV Push once  dextrose 50% Injectable 25 Gram(s) IV Push once  glucagon  Injectable 1 milliGRAM(s) IntraMuscular once  insulin lispro (ADMELOG) corrective regimen sliding scale   SubCutaneous three times a day before meals  losartan 25 milliGRAM(s) Oral daily  pantoprazole    Tablet 40 milliGRAM(s) Oral before breakfast  simvastatin 20 milliGRAM(s) Oral at bedtime  sodium chloride 0.9%. 1000 milliLiter(s) (100 mL/Hr) IV Continuous <Continuous>    MEDICATIONS  (PRN):  acetaminophen     Tablet .. 650 milliGRAM(s) Oral every 6 hours PRN Temp greater or equal to 38C (100.4F), Mild Pain (1 - 3)  aluminum hydroxide/magnesium hydroxide/simethicone Suspension 30 milliLiter(s) Oral every 4 hours PRN Dyspepsia  dextrose Oral Gel 15 Gram(s) Oral once PRN Blood Glucose LESS THAN 70 milliGRAM(s)/deciliter  melatonin 3 milliGRAM(s) Oral at bedtime PRN Insomnia  ondansetron Injectable 4 milliGRAM(s) IV Push every 8 hours PRN Nausea and/or Vomiting      Allergies    No Known Drug Allergies  strawberry (Anaphylaxis)    Intolerances        Vital Signs Last 24 Hrs  T(C): 36.7 (19 Apr 2024 15:57), Max: 36.8 (18 Apr 2024 23:39)  T(F): 98 (19 Apr 2024 15:57), Max: 98.3 (18 Apr 2024 23:39)  HR: 90 (19 Apr 2024 15:57) (79 - 91)  BP: 118/69 (19 Apr 2024 15:57) (117/67 - 125/70)  BP(mean): --  RR: 18 (19 Apr 2024 15:57) (18 - 18)  SpO2: 98% (19 Apr 2024 15:57) (98% - 99%)        PHYSICAL EXAM:  General: NAD.  CVS: S1, S2  Chest: air entry bilaterally present  Abd: BS present, soft, non-tender      LABS:                        8.2    6.41  )-----------( 228      ( 19 Apr 2024 13:06 )             26.5     04-19    143  |  112<H>  |  6<L>  ----------------------------<  101<H>  4.0   |  27  |  0.76    Ca    8.7      19 Apr 2024 06:57    TPro  5.9<L>  /  Alb  2.7<L>  /  TBili  0.6  /  DBili  x   /  AST  15  /  ALT  12  /  AlkPhos  33<L>  04-19        Follow CBc  advance diet  Pt likely with R sided diverticular bleed      
No further bleeding noted  Hgb essentially unchanged  Pt had recent colonoscopy showing probable R sided diverticular bleed      MEDICATIONS  (STANDING):  dextrose 10% Bolus 125 milliLiter(s) IV Bolus once  dextrose 5%. 1000 milliLiter(s) (50 mL/Hr) IV Continuous <Continuous>  dextrose 5%. 1000 milliLiter(s) (100 mL/Hr) IV Continuous <Continuous>  dextrose 50% Injectable 12.5 Gram(s) IV Push once  dextrose 50% Injectable 25 Gram(s) IV Push once  glucagon  Injectable 1 milliGRAM(s) IntraMuscular once  insulin lispro (ADMELOG) corrective regimen sliding scale   SubCutaneous three times a day before meals  losartan 25 milliGRAM(s) Oral daily  pantoprazole    Tablet 40 milliGRAM(s) Oral before breakfast  simvastatin 20 milliGRAM(s) Oral at bedtime  sodium chloride 0.9%. 1000 milliLiter(s) (100 mL/Hr) IV Continuous <Continuous>    MEDICATIONS  (PRN):  acetaminophen     Tablet .. 650 milliGRAM(s) Oral every 6 hours PRN Temp greater or equal to 38C (100.4F), Mild Pain (1 - 3)  aluminum hydroxide/magnesium hydroxide/simethicone Suspension 30 milliLiter(s) Oral every 4 hours PRN Dyspepsia  dextrose Oral Gel 15 Gram(s) Oral once PRN Blood Glucose LESS THAN 70 milliGRAM(s)/deciliter  melatonin 3 milliGRAM(s) Oral at bedtime PRN Insomnia  ondansetron Injectable 4 milliGRAM(s) IV Push every 8 hours PRN Nausea and/or Vomiting      Allergies    No Known Drug Allergies  strawberry (Anaphylaxis)    Intolerances        Vital Signs Last 24 Hrs  T(C): 37 (20 Apr 2024 10:06), Max: 37 (20 Apr 2024 10:06)  T(F): 98.6 (20 Apr 2024 10:06), Max: 98.6 (20 Apr 2024 10:06)  HR: 90 (20 Apr 2024 10:06) (79 - 92)  BP: 117/75 (20 Apr 2024 10:06) (117/75 - 123/69)  BP(mean): 89 (20 Apr 2024 04:58) (87 - 89)  RR: 18 (20 Apr 2024 10:06) (17 - 19)  SpO2: 96% (20 Apr 2024 10:06) (96% - 99%)        PHYSICAL EXAM:  General: NAD.  CVS: S1, S2  Chest: air entry bilaterally present  Abd: BS present, soft, non-tender      LABS:                        8.1    7.98  )-----------( 235      ( 20 Apr 2024 06:47 )             26.1     04-20    146<H>  |  112<H>  |  10  ----------------------------<  92  3.7   |  26  |  0.74    Ca    8.6      20 Apr 2024 06:47    TPro  5.8<L>  /  Alb  2.6<L>  /  TBili  0.7  /  DBili  x   /  AST  18  /  ALT  13  /  AlkPhos  35<L>  04-20      Pt told to see PCP this week for repeat CB  I bleeding recurs pt to return to ER    
Pt with LGI Bleed  s/p colonoscopy last week which showed R sided diverticular bleed      MEDICATIONS  (STANDING):  dextrose 10% Bolus 125 milliLiter(s) IV Bolus once  dextrose 5%. 1000 milliLiter(s) (50 mL/Hr) IV Continuous <Continuous>  dextrose 5%. 1000 milliLiter(s) (100 mL/Hr) IV Continuous <Continuous>  dextrose 50% Injectable 25 Gram(s) IV Push once  dextrose 50% Injectable 12.5 Gram(s) IV Push once  glucagon  Injectable 1 milliGRAM(s) IntraMuscular once  insulin lispro (ADMELOG) corrective regimen sliding scale   SubCutaneous three times a day before meals  losartan 25 milliGRAM(s) Oral daily  pantoprazole    Tablet 40 milliGRAM(s) Oral before breakfast  simvastatin 20 milliGRAM(s) Oral at bedtime  sodium chloride 0.9%. 1000 milliLiter(s) (100 mL/Hr) IV Continuous <Continuous>    MEDICATIONS  (PRN):  acetaminophen     Tablet .. 650 milliGRAM(s) Oral every 6 hours PRN Temp greater or equal to 38C (100.4F), Mild Pain (1 - 3)  aluminum hydroxide/magnesium hydroxide/simethicone Suspension 30 milliLiter(s) Oral every 4 hours PRN Dyspepsia  dextrose Oral Gel 15 Gram(s) Oral once PRN Blood Glucose LESS THAN 70 milliGRAM(s)/deciliter  melatonin 3 milliGRAM(s) Oral at bedtime PRN Insomnia  ondansetron Injectable 4 milliGRAM(s) IV Push every 8 hours PRN Nausea and/or Vomiting      Allergies    No Known Drug Allergies  strawberry (Anaphylaxis)    Intolerances        Vital Signs Last 24 Hrs  T(C): 37.3 (18 Apr 2024 11:06), Max: 37.3 (18 Apr 2024 11:06)  T(F): 99.1 (18 Apr 2024 11:06), Max: 99.1 (18 Apr 2024 11:06)  HR: 93 (18 Apr 2024 11:06) (91 - 110)  BP: 147/67 (18 Apr 2024 11:06) (106/60 - 147/67)  BP(mean): --  RR: 18 (18 Apr 2024 11:06) (18 - 18)  SpO2: 98% (18 Apr 2024 11:06) (98% - 100%)    Parameters below as of 18 Apr 2024 05:45  Patient On (Oxygen Delivery Method): room air        PHYSICAL EXAM:  General: NAD.  CVS: S1, S2  Chest: air entry bilaterally present  Abd: BS present, soft, non-tender      LABS:                        7.8    7.32  )-----------( 217      ( 18 Apr 2024 06:25 )             24.9     04-17    144  |  114<H>  |  23  ----------------------------<  144<H>  5.0   |  26  |  0.96    Ca    8.6      17 Apr 2024 13:00    TPro  6.4  /  Alb  2.7<L>  /  TBili  0.6  /  DBili  x   /  AST  29  /  ALT  17  /  AlkPhos  31<L>  04-17      continue clear liquids  Pt transfused 1u prbc's this AM  repeat CBC in AM        
Patient is a 68y old  Female who presents with a chief complaint of Acute blood loss anemia secondary to lower GI bleed (18 Apr 2024 14:46)    INTERVAL HPI/OVERNIGHT EVENTS: No acute events overnight. HD stable.     MEDICATIONS  (STANDING):  dextrose 10% Bolus 125 milliLiter(s) IV Bolus once  dextrose 5%. 1000 milliLiter(s) (50 mL/Hr) IV Continuous <Continuous>  dextrose 5%. 1000 milliLiter(s) (100 mL/Hr) IV Continuous <Continuous>  dextrose 50% Injectable 12.5 Gram(s) IV Push once  dextrose 50% Injectable 25 Gram(s) IV Push once  glucagon  Injectable 1 milliGRAM(s) IntraMuscular once  insulin lispro (ADMELOG) corrective regimen sliding scale   SubCutaneous three times a day before meals  losartan 25 milliGRAM(s) Oral daily  pantoprazole    Tablet 40 milliGRAM(s) Oral before breakfast  simvastatin 20 milliGRAM(s) Oral at bedtime  sodium chloride 0.9%. 1000 milliLiter(s) (100 mL/Hr) IV Continuous <Continuous>    MEDICATIONS  (PRN):  acetaminophen     Tablet .. 650 milliGRAM(s) Oral every 6 hours PRN Temp greater or equal to 38C (100.4F), Mild Pain (1 - 3)  aluminum hydroxide/magnesium hydroxide/simethicone Suspension 30 milliLiter(s) Oral every 4 hours PRN Dyspepsia  dextrose Oral Gel 15 Gram(s) Oral once PRN Blood Glucose LESS THAN 70 milliGRAM(s)/deciliter  melatonin 3 milliGRAM(s) Oral at bedtime PRN Insomnia  ondansetron Injectable 4 milliGRAM(s) IV Push every 8 hours PRN Nausea and/or Vomiting      Allergies    No Known Drug Allergies  strawberry (Anaphylaxis)    Intolerances        REVIEW OF SYSTEMS: all negative with exception of above    Vital Signs Last 24 Hrs  T(C): 36.4 (19 Apr 2024 10:59), Max: 36.8 (18 Apr 2024 23:39)  T(F): 97.6 (19 Apr 2024 10:59), Max: 98.3 (18 Apr 2024 23:39)  HR: 79 (19 Apr 2024 10:59) (79 - 93)  BP: 119/73 (19 Apr 2024 10:59) (117/67 - 125/70)  BP(mean): --  RR: 18 (19 Apr 2024 10:59) (18 - 18)  SpO2: 98% (19 Apr 2024 10:59) (98% - 99%)    PHYSICAL EXAM:  CONSTITUTIONAL: Well groomed, no apparent distress, pleasant, conversational  EYES: PERRLA and symmetric, EOMI  ENMT: Oral mucosa with moist membranes  RESP: No respiratory distress, no use of accessory muscles; CTA b/l  CV: RRR  GI: Soft, NT, ND    LABS:                        8.3    6.99  )-----------( 225      ( 19 Apr 2024 06:57 )             25.7     04-19    143  |  112<H>  |  6<L>  ----------------------------<  101<H>  4.0   |  27  |  0.76    Ca    8.7      19 Apr 2024 06:57    TPro  5.9<L>  /  Alb  2.7<L>  /  TBili  0.6  /  DBili  x   /  AST  15  /  ALT  12  /  AlkPhos  33<L>  04-19      Urinalysis Basic - ( 19 Apr 2024 06:57 )    Color: x / Appearance: x / SG: x / pH: x  Gluc: 101 mg/dL / Ketone: x  / Bili: x / Urobili: x   Blood: x / Protein: x / Nitrite: x   Leuk Esterase: x / RBC: x / WBC x   Sq Epi: x / Non Sq Epi: x / Bacteria: x      CAPILLARY BLOOD GLUCOSE      POCT Blood Glucose.: 111 mg/dL (19 Apr 2024 10:59)  POCT Blood Glucose.: 103 mg/dL (19 Apr 2024 07:46)  POCT Blood Glucose.: 78 mg/dL (18 Apr 2024 21:33)  POCT Blood Glucose.: 156 mg/dL (18 Apr 2024 17:29)      RADIOLOGY & ADDITIONAL TESTS:    Imaging Personally Reviewed:  [ ] YES  [ ] NO    Consultant(s) Notes Reviewed:  [ ] YES  [ ] NO    Care Discussed with Consultants/Other Providers [ ] YES  [ ] NO
no

## 2024-04-20 NOTE — DISCHARGE NOTE NURSING/CASE MANAGEMENT/SOCIAL WORK - NSDCPEFALRISK_GEN_ALL_CORE
For information on Fall & Injury Prevention, visit: [email or web address] OR  [email or web address] OR  https://www.cdc.gov/steadi/patient.html

## 2024-04-20 NOTE — DISCHARGE NOTE PROVIDER - HOSPITAL COURSE
Eunice Reed is a 68 year old female with PMHx of HTN, HLD, NIDDM2, gastritis, and R. renal masses (recently noted on imaging) who presented to the ED on 4/17/24 for complaints of rectal bleeding and admitted for acute blood loss anemia secondary to lower GI bleed. Patient seen by GI and likely deemed to have diverticular bleed. Patient with stable H/H and medically managed. Patient is medically optimized for discharge to home.

## 2024-04-20 NOTE — DISCHARGE NOTE PROVIDER - NSDCFUSCHEDAPPT_GEN_ALL_CORE_FT
Kvng Dover  [facility] Physician [facility]  UROLOGY [address]  Scheduled Appointment: 04/30/2024

## 2024-04-20 NOTE — DISCHARGE NOTE NURSING/CASE MANAGEMENT/SOCIAL WORK - PATIENT PORTAL LINK FT
You can access the FollowMyHealth Patient Portal offered by [facility] by registering at the following website: [email or web address]. By joining [facility]’s FollowMyHealth portal, you will also be able to view your health information using other applications (apps) compatible with our system.

## 2024-04-20 NOTE — DISCHARGE NOTE PROVIDER - ATTENDING DISCHARGE PHYSICAL EXAMINATION:
Vital Signs Last 24 Hrs  T(F): 98.6 (20 Apr 2024 10:06), Max: 98.6 (20 Apr 2024 10:06)  HR: 90 (20 Apr 2024 10:06) (79 - 92)  BP: 117/75 (20 Apr 2024 10:06) (117/75 - 123/69)  RR: 18 (20 Apr 2024 10:06) (17 - 19)  SpO2: 96% (20 Apr 2024 10:06) (96% - 99%)    Physical Exam:  Constitutional: NAD, awake and alert  Respiratory: cta b/l no wheezing no rhonchi  Cardiovascular: +s1/s2 no edema b/l le  Gastrointestinal: soft nt nd bs+  Vascular: 2+ peripheral pulses  Neurological: A/O x 3, no focal deficits

## 2024-04-20 NOTE — DISCHARGE NOTE PROVIDER - NSDCCPCAREPLAN_GEN_ALL_CORE_FT
PRINCIPAL DISCHARGE DIAGNOSIS  Diagnosis: Acute blood loss anemia  Assessment and Plan of Treatment: 2/2 rectal bleeding      SECONDARY DISCHARGE DIAGNOSES  Diagnosis: HTN (hypertension)  Assessment and Plan of Treatment:     Diagnosis: HLD (hyperlipidemia)  Assessment and Plan of Treatment:     Diagnosis: Diabetes mellitus type II, non insulin dependent  Assessment and Plan of Treatment:     Diagnosis: Right renal mass  Assessment and Plan of Treatment:     Diagnosis: Lactic acidosis  Assessment and Plan of Treatment:

## 2024-04-25 DIAGNOSIS — Z87.891 PERSONAL HISTORY OF NICOTINE DEPENDENCE: ICD-10-CM

## 2024-04-25 DIAGNOSIS — D62 ACUTE POSTHEMORRHAGIC ANEMIA: ICD-10-CM

## 2024-04-25 DIAGNOSIS — E11.9 TYPE 2 DIABETES MELLITUS WITHOUT COMPLICATIONS: ICD-10-CM

## 2024-04-25 DIAGNOSIS — I10 ESSENTIAL (PRIMARY) HYPERTENSION: ICD-10-CM

## 2024-04-25 DIAGNOSIS — Z91.018 ALLERGY TO OTHER FOODS: ICD-10-CM

## 2024-04-25 DIAGNOSIS — E87.20 ACIDOSIS, UNSPECIFIED: ICD-10-CM

## 2024-04-25 DIAGNOSIS — K62.5 HEMORRHAGE OF ANUS AND RECTUM: ICD-10-CM

## 2024-04-25 DIAGNOSIS — Z79.84 LONG TERM (CURRENT) USE OF ORAL HYPOGLYCEMIC DRUGS: ICD-10-CM

## 2024-04-25 DIAGNOSIS — K57.91 DIVERTICULOSIS OF INTESTINE, PART UNSPECIFIED, WITHOUT PERFORATION OR ABSCESS WITH BLEEDING: ICD-10-CM

## 2024-04-29 PROBLEM — Z00.00 ENCOUNTER FOR PREVENTIVE HEALTH EXAMINATION: Status: ACTIVE | Noted: 2024-04-29

## 2024-04-30 ENCOUNTER — APPOINTMENT (OUTPATIENT)
Dept: UROLOGY | Facility: CLINIC | Age: 69
End: 2024-04-30
Payer: COMMERCIAL

## 2024-04-30 VITALS
TEMPERATURE: 97.5 F | HEART RATE: 89 BPM | DIASTOLIC BLOOD PRESSURE: 63 MMHG | WEIGHT: 170 LBS | BODY MASS INDEX: 26.68 KG/M2 | HEIGHT: 67 IN | SYSTOLIC BLOOD PRESSURE: 102 MMHG | OXYGEN SATURATION: 100 %

## 2024-04-30 DIAGNOSIS — Z78.9 OTHER SPECIFIED HEALTH STATUS: ICD-10-CM

## 2024-04-30 DIAGNOSIS — Z80.42 FAMILY HISTORY OF MALIGNANT NEOPLASM OF PROSTATE: ICD-10-CM

## 2024-04-30 PROCEDURE — 99203 OFFICE O/P NEW LOW 30 MIN: CPT

## 2024-04-30 PROCEDURE — 99213 OFFICE O/P EST LOW 20 MIN: CPT

## 2024-04-30 RX ORDER — SIMVASTATIN 80 MG/1
TABLET, FILM COATED ORAL
Refills: 0 | Status: ACTIVE | COMMUNITY

## 2024-04-30 RX ORDER — PANTOPRAZOLE 40 MG/1
40 TABLET, DELAYED RELEASE ORAL
Refills: 0 | Status: ACTIVE | COMMUNITY

## 2024-04-30 RX ORDER — LOSARTAN POTASSIUM 25 MG/1
25 TABLET, FILM COATED ORAL
Refills: 0 | Status: ACTIVE | COMMUNITY

## 2024-04-30 RX ORDER — METFORMIN HYDROCHLORIDE 625 MG/1
TABLET ORAL
Refills: 0 | Status: ACTIVE | COMMUNITY

## 2024-04-30 NOTE — ASSESSMENT
[FreeTextEntry1] : 69y/o female with incidental finding of right 4.8cm renal mass CT scan was performed for GI bleeding. Asymptomatic.  CT scan KIDNEYS/URETERS: Right upper and right lower pole renal mass is similar to prior concerning for neoplasm.  Additional bilateral renal cysts.  Comes to office for planning.  Ordering NM renal function study to evaluate contralateral kidney function. Will F/U afterwards for surgical planning.

## 2024-04-30 NOTE — HISTORY OF PRESENT ILLNESS
[FreeTextEntry1] : 67y/o female with incidental finding of right 4.8cm renal mass CT scan was performed for GI bleeding. Asymptomatic.  CT scan KIDNEYS/URETERS: Right upper and right lower pole renal mass is similar to prior concerning for neoplasm.  Additional bilateral renal cysts.  Comes to office for planning.

## 2024-05-07 PROBLEM — E11.9 TYPE 2 DIABETES MELLITUS WITHOUT COMPLICATIONS: Chronic | Status: ACTIVE | Noted: 2024-04-17

## 2024-05-07 PROBLEM — N28.89 OTHER SPECIFIED DISORDERS OF KIDNEY AND URETER: Chronic | Status: ACTIVE | Noted: 2024-04-17

## 2024-05-07 PROBLEM — E78.5 HYPERLIPIDEMIA, UNSPECIFIED: Chronic | Status: ACTIVE | Noted: 2024-04-17

## 2024-05-07 PROBLEM — K29.70 GASTRITIS, UNSPECIFIED, WITHOUT BLEEDING: Chronic | Status: ACTIVE | Noted: 2024-04-17

## 2024-05-14 ENCOUNTER — APPOINTMENT (OUTPATIENT)
Dept: NUCLEAR MEDICINE | Facility: HOSPITAL | Age: 69
End: 2024-05-14
Payer: COMMERCIAL

## 2024-05-14 ENCOUNTER — OUTPATIENT (OUTPATIENT)
Dept: OUTPATIENT SERVICES | Facility: HOSPITAL | Age: 69
LOS: 1 days | End: 2024-05-14

## 2024-05-14 DIAGNOSIS — D49.519 NEOPLASM OF UNSPECIFIED BEHAVIOR OF UNSPECIFIED KIDNEY: ICD-10-CM

## 2024-05-14 PROCEDURE — 78707 K FLOW/FUNCT IMAGE W/O DRUG: CPT | Mod: 26

## 2024-05-24 ENCOUNTER — APPOINTMENT (OUTPATIENT)
Dept: UROLOGY | Facility: CLINIC | Age: 69
End: 2024-05-24
Payer: COMMERCIAL

## 2024-05-24 VITALS
HEART RATE: 95 BPM | WEIGHT: 168 LBS | HEIGHT: 67 IN | DIASTOLIC BLOOD PRESSURE: 66 MMHG | SYSTOLIC BLOOD PRESSURE: 124 MMHG | TEMPERATURE: 97 F | BODY MASS INDEX: 26.37 KG/M2 | OXYGEN SATURATION: 96 %

## 2024-05-24 DIAGNOSIS — D49.519 NEOPLASM OF UNSPECIFIED BEHAVIOR OF UNSPECIFIED KIDNEY: ICD-10-CM

## 2024-05-24 PROCEDURE — 99214 OFFICE O/P EST MOD 30 MIN: CPT | Mod: 57

## 2024-05-24 NOTE — PHYSICAL EXAM
Complete blood tests      [Normal Appearance] : normal appearance [Well Groomed] : well groomed [General Appearance - In No Acute Distress] : no acute distress [Edema] : no peripheral edema [Respiration, Rhythm And Depth] : normal respiratory rhythm and effort [Exaggerated Use Of Accessory Muscles For Inspiration] : no accessory muscle use [Abdomen Soft] : soft [Abdomen Tenderness] : non-tender [Costovertebral Angle Tenderness] : no ~M costovertebral angle tenderness [Urinary Bladder Findings] : the bladder was normal on palpation [Normal Station and Gait] : the gait and station were normal for the patient's age [] : no rash [No Focal Deficits] : no focal deficits [Oriented To Time, Place, And Person] : oriented to person, place, and time [Affect] : the affect was normal [Mood] : the mood was normal [No Palpable Adenopathy] : no palpable adenopathy

## 2024-05-24 NOTE — HISTORY OF PRESENT ILLNESS
[FreeTextEntry1] : 67y/o female with incidental finding of right 4.8cm renal mass CT scan was performed for GI bleeding. Asymptomatic.  CT scan KIDNEYS/URETERS: Right upper and right lower pole renal mass is similar to prior concerning for neoplasm.  Additional bilateral renal cysts.  Comes to office to discuss NM and planning

## 2024-05-24 NOTE — ASSESSMENT
[FreeTextEntry1] : 67y/o female with incidental finding of right 4.8cm renal mass CT scan was performed for GI bleeding. Asymptomatic.  CT scan KIDNEYS/URETERS: Right upper and right lower pole renal mass is similar to prior concerning for neoplasm.  Additional bilateral renal cysts.  Comes to office to discuss NM and planning  IMPRESSION:  Normal renal scan. Good symmetric flow and renal function. Differential renal function: Right kidney: 48%; left kidney: 52%.  In consideration of the diagnosed disease, all different treatment options are discussed, including enucleation and enucleoresection. Different possibilities are extensively explained with all pros and cons. The patient agreed to undergo Robotic Assisted right partial nephrectomy, possible radical. The procedure is described with video summary. Possible complications, average duration and length of hospital stay were discussed.  The patient is booked for surgery

## 2024-06-13 ENCOUNTER — OUTPATIENT (OUTPATIENT)
Dept: OUTPATIENT SERVICES | Facility: HOSPITAL | Age: 69
LOS: 1 days | Discharge: HOME | End: 2024-06-13

## 2024-06-13 VITALS
SYSTOLIC BLOOD PRESSURE: 124 MMHG | HEART RATE: 91 BPM | WEIGHT: 167.33 LBS | DIASTOLIC BLOOD PRESSURE: 93 MMHG | RESPIRATION RATE: 18 BRPM | TEMPERATURE: 98 F | OXYGEN SATURATION: 96 % | HEIGHT: 67 IN

## 2024-06-13 DIAGNOSIS — D49.519 NEOPLASM OF UNSPECIFIED BEHAVIOR OF UNSPECIFIED KIDNEY: ICD-10-CM

## 2024-06-13 DIAGNOSIS — Z01.818 ENCOUNTER FOR OTHER PREPROCEDURAL EXAMINATION: ICD-10-CM

## 2024-06-13 DIAGNOSIS — Z98.890 OTHER SPECIFIED POSTPROCEDURAL STATES: Chronic | ICD-10-CM

## 2024-06-13 DIAGNOSIS — I10 ESSENTIAL (PRIMARY) HYPERTENSION: ICD-10-CM

## 2024-06-13 DIAGNOSIS — E11.9 TYPE 2 DIABETES MELLITUS WITHOUT COMPLICATIONS: ICD-10-CM

## 2024-06-13 DIAGNOSIS — Z98.891 HISTORY OF UTERINE SCAR FROM PREVIOUS SURGERY: Chronic | ICD-10-CM

## 2024-06-13 LAB
ALBUMIN SERPL ELPH-MCNC: 3.8 G/DL — SIGNIFICANT CHANGE UP (ref 3.3–5)
ALP SERPL-CCNC: 48 U/L — SIGNIFICANT CHANGE UP (ref 40–120)
ALT FLD-CCNC: 16 U/L — SIGNIFICANT CHANGE UP (ref 12–78)
ANION GAP SERPL CALC-SCNC: 3 MMOL/L — LOW (ref 5–17)
APPEARANCE UR: CLEAR — SIGNIFICANT CHANGE UP
APTT BLD: 29.8 SEC — SIGNIFICANT CHANGE UP (ref 24.5–35.6)
AST SERPL-CCNC: 15 U/L — SIGNIFICANT CHANGE UP (ref 15–37)
BASOPHILS # BLD AUTO: 0.03 K/UL — SIGNIFICANT CHANGE UP (ref 0–0.2)
BASOPHILS NFR BLD AUTO: 0.4 % — SIGNIFICANT CHANGE UP (ref 0–2)
BILIRUB SERPL-MCNC: 0.4 MG/DL — SIGNIFICANT CHANGE UP (ref 0.2–1.2)
BILIRUB UR-MCNC: NEGATIVE — SIGNIFICANT CHANGE UP
BLD GP AB SCN SERPL QL: SIGNIFICANT CHANGE UP
BUN SERPL-MCNC: 13 MG/DL — SIGNIFICANT CHANGE UP (ref 7–23)
CALCIUM SERPL-MCNC: 9.8 MG/DL — SIGNIFICANT CHANGE UP (ref 8.5–10.1)
CHLORIDE SERPL-SCNC: 110 MMOL/L — HIGH (ref 96–108)
CO2 SERPL-SCNC: 29 MMOL/L — SIGNIFICANT CHANGE UP (ref 22–31)
COLOR SPEC: YELLOW — SIGNIFICANT CHANGE UP
CREAT SERPL-MCNC: 0.98 MG/DL — SIGNIFICANT CHANGE UP (ref 0.5–1.3)
DACRYOCYTES BLD QL SMEAR: SLIGHT — SIGNIFICANT CHANGE UP
DIFF PNL FLD: NEGATIVE — SIGNIFICANT CHANGE UP
EGFR: 62 ML/MIN/1.73M2 — SIGNIFICANT CHANGE UP
ELLIPTOCYTES BLD QL SMEAR: SLIGHT — SIGNIFICANT CHANGE UP
EOSINOPHIL # BLD AUTO: 0.1 K/UL — SIGNIFICANT CHANGE UP (ref 0–0.5)
EOSINOPHIL NFR BLD AUTO: 1.4 % — SIGNIFICANT CHANGE UP (ref 0–6)
GLUCOSE SERPL-MCNC: 116 MG/DL — HIGH (ref 70–99)
GLUCOSE UR QL: NEGATIVE MG/DL — SIGNIFICANT CHANGE UP
HCT VFR BLD CALC: 35.3 % — SIGNIFICANT CHANGE UP (ref 34.5–45)
HGB BLD-MCNC: 10.5 G/DL — LOW (ref 11.5–15.5)
HYPOCHROMIA BLD QL: SLIGHT — SIGNIFICANT CHANGE UP
IMM GRANULOCYTES NFR BLD AUTO: 0.3 % — SIGNIFICANT CHANGE UP (ref 0–0.9)
INR BLD: 0.96 RATIO — SIGNIFICANT CHANGE UP (ref 0.85–1.18)
KETONES UR-MCNC: NEGATIVE MG/DL — SIGNIFICANT CHANGE UP
LEUKOCYTE ESTERASE UR-ACNC: NEGATIVE — SIGNIFICANT CHANGE UP
LYMPHOCYTES # BLD AUTO: 1.4 K/UL — SIGNIFICANT CHANGE UP (ref 1–3.3)
LYMPHOCYTES # BLD AUTO: 20.2 % — SIGNIFICANT CHANGE UP (ref 13–44)
MANUAL SMEAR VERIFICATION: SIGNIFICANT CHANGE UP
MCHC RBC-ENTMCNC: 21.9 PG — LOW (ref 27–34)
MCHC RBC-ENTMCNC: 29.7 G/DL — LOW (ref 32–36)
MCV RBC AUTO: 73.5 FL — LOW (ref 80–100)
MONOCYTES # BLD AUTO: 0.43 K/UL — SIGNIFICANT CHANGE UP (ref 0–0.9)
MONOCYTES NFR BLD AUTO: 6.2 % — SIGNIFICANT CHANGE UP (ref 2–14)
NEUTROPHILS # BLD AUTO: 4.95 K/UL — SIGNIFICANT CHANGE UP (ref 1.8–7.4)
NEUTROPHILS NFR BLD AUTO: 71.5 % — SIGNIFICANT CHANGE UP (ref 43–77)
NITRITE UR-MCNC: NEGATIVE — SIGNIFICANT CHANGE UP
NRBC # BLD: 0 /100 WBCS — SIGNIFICANT CHANGE UP (ref 0–0)
OVALOCYTES BLD QL SMEAR: SLIGHT — SIGNIFICANT CHANGE UP
PH UR: 5 — SIGNIFICANT CHANGE UP (ref 5–8)
PLAT MORPH BLD: NORMAL — SIGNIFICANT CHANGE UP
PLATELET # BLD AUTO: 277 K/UL — SIGNIFICANT CHANGE UP (ref 150–400)
POTASSIUM SERPL-MCNC: 3.8 MMOL/L — SIGNIFICANT CHANGE UP (ref 3.5–5.3)
POTASSIUM SERPL-SCNC: 3.8 MMOL/L — SIGNIFICANT CHANGE UP (ref 3.5–5.3)
PROT SERPL-MCNC: 7.9 GM/DL — SIGNIFICANT CHANGE UP (ref 6–8.3)
PROT UR-MCNC: SIGNIFICANT CHANGE UP MG/DL
PROTHROM AB SERPL-ACNC: 11.5 SEC — SIGNIFICANT CHANGE UP (ref 9.5–13)
RBC # BLD: 4.8 M/UL — SIGNIFICANT CHANGE UP (ref 3.8–5.2)
RBC # FLD: 15.1 % — HIGH (ref 10.3–14.5)
RBC BLD AUTO: SIGNIFICANT CHANGE UP
SODIUM SERPL-SCNC: 142 MMOL/L — SIGNIFICANT CHANGE UP (ref 135–145)
SP GR SPEC: 1.03 — SIGNIFICANT CHANGE UP (ref 1–1.03)
UROBILINOGEN FLD QL: 1 MG/DL — SIGNIFICANT CHANGE UP (ref 0.2–1)
WBC # BLD: 6.93 K/UL — SIGNIFICANT CHANGE UP (ref 3.8–10.5)
WBC # FLD AUTO: 6.93 K/UL — SIGNIFICANT CHANGE UP (ref 3.8–10.5)

## 2024-06-13 NOTE — H&P PST ADULT - PROBLEM SELECTOR PLAN 1
robotic assisted right partial nephrectomy  Pre-op instructions given by RN, patient verbalized understanding  Chlorhexidine wash instructions given   medica clearance  Anesthesiologist to review PST labs, EKG, required clearances and optimization for surgery.

## 2024-06-13 NOTE — H&P PST ADULT - ASSESSMENT
68F PMHx  HTN, HLD, NIDDM2, gastritis here for PST for scheduled robotic assisted right partial nephrectomy with Dr. Dover on 2024  CAPRINI SCORE    AGE RELATED RISK FACTORS                                                             [ ] Age 41-60 years                                            (1 Point)  [x Age: 61-74 years                                           (2 Points)                 [ ] Age= 75 years                                                (3 Points)             DISEASE RELATED RISK FACTORS                                                       [ ] Edema in the lower extremities                 (1 Point)                     [ ] Varicose veins                                               (1 Point)                                 [ ] BMI > 25 Kg/m2                                            (1 Point)                                  [ ] Serious infection (ie PNA)                            (1 Point)                     [ ] Lung disease ( COPD, Emphysema)            (1 Point)                                                                          [ ] Acute myocardial infarction                         (1 Point)                  [ ] Congestive heart failure (in the previous month)  (1 Point)         [ ] Inflammatory bowel disease                            (1 Point)                  [ ] Central venous access, PICC or Port               (2 points)       (within the last month)                                                                [ ] Stroke (in the previous month)                        (5 Points)    [ ] Previous or present malignancy                       (2 points)                                                                                                                                                         HEMATOLOGY RELATED FACTORS                                                         [ ] Prior episodes of VTE                                     (3 Points)                     [ ] Positive family history for VTE                      (3 Points)                  [ ] Prothrombin 78488 A                                     (3 Points)                     [ ] Factor V Leiden                                                (3 Points)                        [ ] Lupus anticoagulants                                      (3 Points)                                                           [ ] Anticardiolipin antibodies                              (3 Points)                                                       [ ] High homocysteine in the blood                   (3 Points)                                             [ ] Other congenital or acquired thrombophilia      (3 Points)                                                [ ] Heparin induced thrombocytopenia                  (3 Points)                                        MOBILITY RELATED FACTORS  [ ] Bed rest                                                         (1 Point)  [ ] Plaster cast                                                    (2 points)  [ ] Bed bound for more than 72 hours           (2 Points)    GENDER SPECIFIC FACTORS  [ ] Pregnancy or had a baby within the last month   (1 Point)  [ ] Post-partum < 6 weeks                                   (1 Point)  [ ] Hormonal therapy  or oral contraception   (1 Point)  [ ] History of pregnancy complications              (1 point)  [ ] Unexplained or recurrent              (1 Point)    OTHER RISK FACTORS                                           (1 Point)  [ ] BMI >40, smoking, diabetes requiring insulin, chemotherapy  blood transfusions and length of surgery over 2 hours    SURGERY RELATED RISK FACTORS  [ ]  Section within the last month     (1 Point)  [ ] Minor surgery                                                  (1 Point)  [ ] Arthroscopic surgery                                       (2 Points)  [ x] Planned major surgery lasting more            (2 Points)      than 45 minutes     [ ] Elective hip or knee joint replacement       (5 points)       surgery                                                TRAUMA RELATED RISK FACTORS  [ ] Fracture of the hip, pelvis, or leg                       (5 Points)  [ ] Spinal cord injury resulting in paralysis             (5 points)       (in the previous month)    [ ] Paralysis  (less than 1 month)                             (5 Points)  [ ] Multiple Trauma within 1 month                        (5 Points)    Total Score [      4  ]    Caprini Score 0-2: Low Risk, NO VTE prophylaxis required for most patients, encourage ambulation  Caprini Score 3-6: Moderate Risk , pharmacologic VTE prophylaxis is indicated for most patients (in the absence of contraindications)  Caprini Score Greater than or =7: High risk, pharmocologic VTE prophylaxis indicated for most patients (in the absence of contraindications)

## 2024-06-13 NOTE — H&P PST ADULT - HISTORY OF PRESENT ILLNESS
......Eunice Reed is a 68 year old female with PMHx of HTN, HLD, NIDDM2, gastritis, and R. renal masses (recently noted on imaging) who presented to the ED on 4/17/24 for complaints of rectal bleeding.    Patient reports she has not noticed any blood per rectum since discharge until this morning around 10:30 AM. Patient states she had two episodes of dark red blood clots while having a bowel movement. Denies hematuria or hematemesis. Denies consumption of red foods or liquids. No NSAID use. Baseline functional status is ambulates unassisted and independent with all ADLs. Lives at home with son since her  passed away three years ago.    Recent admission from 4/9/24 - 4/12/24 for blood in stool. CTA A/P without active bleed but revealed colonic diverticulosis and two right renal masses. Evaluated by GI and presumed to have diverticular bleed. Received a total of 3 U pRBCs. EGD on 4/11 with gastritis and hiatal hernia. Colonoscopy on 4/11 revealed diverticulosis with colonic polyps and internal hemorrhoids. Gastric antrum biopsy negative for H. pylori. Cecum biopsy with colonic mucosa with lymphoid aggregate. Sigmoid colon biopsy with lymphoid aggregrate and focal hyperplastic change. Course complicated with episode of syncope which was likely vasovagal. Echocardiogram with normal LV EF with trace MR. Orthostatics negative. Evaluated by urology for right renal masses and advised outpatient follow up with surgery. Discharged home.    In the ED, VSS except HR as elevated as 122 and BP as low as 106/60. Hgb 7.8, lactic acid 2.6. CXR unremarkable. CTA A/P without active GI bleed and with colonic diverticulosis without diverticulitis. Received 1L NS bolus. Requested ER physician to consult GI. 68F PMHx  HTN, HLD, NIDDM2, gastritis here for PST for scheduled robotic assisted right partial nephrectomy with Dr. Dover on 6-

## 2024-06-14 LAB
CULTURE RESULTS: SIGNIFICANT CHANGE UP
SPECIMEN SOURCE: SIGNIFICANT CHANGE UP

## 2024-06-20 ENCOUNTER — APPOINTMENT (OUTPATIENT)
Dept: UROLOGY | Facility: HOSPITAL | Age: 69
End: 2024-06-20

## 2024-06-26 ENCOUNTER — TRANSCRIPTION ENCOUNTER (OUTPATIENT)
Age: 69
End: 2024-06-26

## 2024-06-27 ENCOUNTER — INPATIENT (INPATIENT)
Facility: HOSPITAL | Age: 69
LOS: 2 days | Discharge: HOME | End: 2024-06-30
Attending: UROLOGY | Admitting: UROLOGY
Payer: COMMERCIAL

## 2024-06-27 ENCOUNTER — APPOINTMENT (OUTPATIENT)
Dept: UROLOGY | Facility: HOSPITAL | Age: 69
End: 2024-06-27

## 2024-06-27 VITALS
TEMPERATURE: 98 F | DIASTOLIC BLOOD PRESSURE: 69 MMHG | SYSTOLIC BLOOD PRESSURE: 116 MMHG | OXYGEN SATURATION: 99 % | HEIGHT: 67 IN | HEART RATE: 87 BPM | RESPIRATION RATE: 16 BRPM | WEIGHT: 160.94 LBS

## 2024-06-27 DIAGNOSIS — Z98.891 HISTORY OF UTERINE SCAR FROM PREVIOUS SURGERY: Chronic | ICD-10-CM

## 2024-06-27 DIAGNOSIS — Z98.890 OTHER SPECIFIED POSTPROCEDURAL STATES: Chronic | ICD-10-CM

## 2024-06-27 LAB
BLD GP AB SCN SERPL QL: SIGNIFICANT CHANGE UP
GLUCOSE BLDC GLUCOMTR-MCNC: 115 MG/DL — HIGH (ref 70–99)
GLUCOSE BLDC GLUCOMTR-MCNC: 152 MG/DL — HIGH (ref 70–99)
GLUCOSE BLDC GLUCOMTR-MCNC: 93 MG/DL — SIGNIFICANT CHANGE UP (ref 70–99)

## 2024-06-27 PROCEDURE — 88307 TISSUE EXAM BY PATHOLOGIST: CPT | Mod: 26

## 2024-06-27 PROCEDURE — 50543 LAPARO PARTIAL NEPHRECTOMY: CPT | Mod: AS

## 2024-06-27 PROCEDURE — 50543 LAPARO PARTIAL NEPHRECTOMY: CPT | Mod: RT

## 2024-06-27 PROCEDURE — 99231 SBSQ HOSP IP/OBS SF/LOW 25: CPT

## 2024-06-27 DEVICE — LIGATING CLIPS WECK HEMOLOK POLYMER LARGE (PURPLE) 6: Type: IMPLANTABLE DEVICE | Site: RIGHT | Status: FUNCTIONAL

## 2024-06-27 RX ORDER — MORPHINE SULFATE 100 MG/1
2 TABLET, EXTENDED RELEASE ORAL EVERY 4 HOURS
Refills: 0 | Status: DISCONTINUED | OUTPATIENT
Start: 2024-06-27 | End: 2024-06-28

## 2024-06-27 RX ORDER — DEXTROSE MONOHYDRATE AND SODIUM CHLORIDE 5; .3 G/100ML; G/100ML
1000 INJECTION, SOLUTION INTRAVENOUS
Refills: 0 | Status: DISCONTINUED | OUTPATIENT
Start: 2024-06-27 | End: 2024-06-29

## 2024-06-27 RX ORDER — INSULIN LISPRO 100 [IU]/ML
INJECTION, SOLUTION SUBCUTANEOUS
Refills: 0 | Status: DISCONTINUED | OUTPATIENT
Start: 2024-06-27 | End: 2024-06-30

## 2024-06-27 RX ORDER — ACETAMINOPHEN 325 MG
1000 TABLET ORAL ONCE
Refills: 0 | Status: COMPLETED | OUTPATIENT
Start: 2024-06-28 | End: 2024-06-28

## 2024-06-27 RX ORDER — ACETAMINOPHEN 325 MG
1000 TABLET ORAL ONCE
Refills: 0 | Status: COMPLETED | OUTPATIENT
Start: 2024-06-27 | End: 2024-06-27

## 2024-06-27 RX ORDER — HEPARIN SODIUM 50 [USP'U]/ML
5000 INJECTION, SOLUTION INTRAVENOUS EVERY 12 HOURS
Refills: 0 | Status: DISCONTINUED | OUTPATIENT
Start: 2024-06-27 | End: 2024-06-30

## 2024-06-27 RX ORDER — DEXTROSE MONOHYDRATE AND SODIUM CHLORIDE 5; .3 G/100ML; G/100ML
1000 INJECTION, SOLUTION INTRAVENOUS
Refills: 0 | Status: DISCONTINUED | OUTPATIENT
Start: 2024-06-27 | End: 2024-06-27

## 2024-06-27 RX ORDER — METFORMIN HYDROCHLORIDE 850 MG/1
2 TABLET ORAL
Refills: 0 | DISCHARGE

## 2024-06-27 RX ORDER — DEXTROSE MONOHYDRATE 100 MG/ML
125 INJECTION, SOLUTION INTRAVENOUS ONCE
Refills: 0 | Status: DISCONTINUED | OUTPATIENT
Start: 2024-06-27 | End: 2024-06-30

## 2024-06-27 RX ORDER — PANTOPRAZOLE SODIUM 40 MG/10ML
40 INJECTION, POWDER, FOR SOLUTION INTRAVENOUS DAILY
Refills: 0 | Status: DISCONTINUED | OUTPATIENT
Start: 2024-06-27 | End: 2024-06-30

## 2024-06-27 RX ORDER — DEXTROSE MONOHYDRATE AND SODIUM CHLORIDE 5; .3 G/100ML; G/100ML
1000 INJECTION, SOLUTION INTRAVENOUS
Refills: 0 | Status: DISCONTINUED | OUTPATIENT
Start: 2024-06-27 | End: 2024-06-30

## 2024-06-27 RX ORDER — DEXTROSE 30 % IN WATER 30 %
25 VIAL (ML) INTRAVENOUS ONCE
Refills: 0 | Status: DISCONTINUED | OUTPATIENT
Start: 2024-06-27 | End: 2024-06-30

## 2024-06-27 RX ORDER — CLINDAMYCIN PHOSPHATE 150 MG/ML
600 INJECTION, SOLUTION INTRAVENOUS EVERY 8 HOURS
Refills: 0 | Status: DISCONTINUED | OUTPATIENT
Start: 2024-06-27 | End: 2024-06-30

## 2024-06-27 RX ORDER — DEXTROSE 30 % IN WATER 30 %
15 VIAL (ML) INTRAVENOUS ONCE
Refills: 0 | Status: DISCONTINUED | OUTPATIENT
Start: 2024-06-27 | End: 2024-06-30

## 2024-06-27 RX ORDER — HYDROMORPHONE HCL 0.2 MG/ML
0.5 INJECTION, SOLUTION INTRAVENOUS
Refills: 0 | Status: DISCONTINUED | OUTPATIENT
Start: 2024-06-27 | End: 2024-06-27

## 2024-06-27 RX ORDER — GLUCAGON HYDROCHLORIDE 1 MG/ML
1 INJECTION, POWDER, FOR SOLUTION INTRAMUSCULAR; INTRAVENOUS; SUBCUTANEOUS ONCE
Refills: 0 | Status: DISCONTINUED | OUTPATIENT
Start: 2024-06-27 | End: 2024-06-30

## 2024-06-27 RX ORDER — DEXTROSE 30 % IN WATER 30 %
12.5 VIAL (ML) INTRAVENOUS ONCE
Refills: 0 | Status: DISCONTINUED | OUTPATIENT
Start: 2024-06-27 | End: 2024-06-30

## 2024-06-27 RX ORDER — SODIUM CHLORIDE 0.9 % (FLUSH) 0.9 %
3 SYRINGE (ML) INJECTION EVERY 8 HOURS
Refills: 0 | Status: DISCONTINUED | OUTPATIENT
Start: 2024-06-27 | End: 2024-06-27

## 2024-06-27 RX ORDER — LOSARTAN POTASSIUM 100 MG/1
25 TABLET, FILM COATED ORAL DAILY
Refills: 0 | Status: DISCONTINUED | OUTPATIENT
Start: 2024-06-27 | End: 2024-06-30

## 2024-06-27 RX ADMIN — Medication 400 MILLIGRAM(S): at 18:03

## 2024-06-27 RX ADMIN — DEXTROSE MONOHYDRATE AND SODIUM CHLORIDE 75 MILLILITER(S): 5; .3 INJECTION, SOLUTION INTRAVENOUS at 21:43

## 2024-06-27 RX ADMIN — DEXTROSE MONOHYDRATE AND SODIUM CHLORIDE 75 MILLILITER(S): 5; .3 INJECTION, SOLUTION INTRAVENOUS at 16:41

## 2024-06-27 RX ADMIN — Medication 400 MILLIGRAM(S): at 23:17

## 2024-06-27 RX ADMIN — HYDROMORPHONE HCL 0.5 MILLIGRAM(S): 0.2 INJECTION, SOLUTION INTRAVENOUS at 17:03

## 2024-06-27 RX ADMIN — LOSARTAN POTASSIUM 25 MILLIGRAM(S): 100 TABLET, FILM COATED ORAL at 21:43

## 2024-06-27 RX ADMIN — CLINDAMYCIN PHOSPHATE 100 MILLIGRAM(S): 150 INJECTION, SOLUTION INTRAVENOUS at 21:43

## 2024-06-27 RX ADMIN — MORPHINE SULFATE 2 MILLIGRAM(S): 100 TABLET, EXTENDED RELEASE ORAL at 21:42

## 2024-06-27 RX ADMIN — MORPHINE SULFATE 2 MILLIGRAM(S): 100 TABLET, EXTENDED RELEASE ORAL at 22:40

## 2024-06-27 RX ADMIN — HYDROMORPHONE HCL 0.5 MILLIGRAM(S): 0.2 INJECTION, SOLUTION INTRAVENOUS at 16:41

## 2024-06-28 LAB
A1C WITH ESTIMATED AVERAGE GLUCOSE RESULT: 6 % — HIGH (ref 4–5.6)
ANION GAP SERPL CALC-SCNC: 3 MMOL/L — LOW (ref 5–17)
BUN SERPL-MCNC: 12 MG/DL — SIGNIFICANT CHANGE UP (ref 7–23)
CALCIUM SERPL-MCNC: 8.5 MG/DL — SIGNIFICANT CHANGE UP (ref 8.5–10.1)
CHLORIDE SERPL-SCNC: 108 MMOL/L — SIGNIFICANT CHANGE UP (ref 96–108)
CO2 SERPL-SCNC: 29 MMOL/L — SIGNIFICANT CHANGE UP (ref 22–31)
CREAT SERPL-MCNC: 0.96 MG/DL — SIGNIFICANT CHANGE UP (ref 0.5–1.3)
EGFR: 64 ML/MIN/1.73M2 — SIGNIFICANT CHANGE UP
ESTIMATED AVERAGE GLUCOSE: 126 MG/DL — HIGH (ref 68–114)
GLUCOSE BLDC GLUCOMTR-MCNC: 100 MG/DL — HIGH (ref 70–99)
GLUCOSE BLDC GLUCOMTR-MCNC: 80 MG/DL — SIGNIFICANT CHANGE UP (ref 70–99)
GLUCOSE BLDC GLUCOMTR-MCNC: 80 MG/DL — SIGNIFICANT CHANGE UP (ref 70–99)
GLUCOSE BLDC GLUCOMTR-MCNC: 95 MG/DL — SIGNIFICANT CHANGE UP (ref 70–99)
GLUCOSE SERPL-MCNC: 109 MG/DL — HIGH (ref 70–99)
HCT VFR BLD CALC: 28.2 % — LOW (ref 34.5–45)
HGB BLD-MCNC: 8.4 G/DL — LOW (ref 11.5–15.5)
MAGNESIUM SERPL-MCNC: 1.8 MG/DL — SIGNIFICANT CHANGE UP (ref 1.6–2.6)
MCHC RBC-ENTMCNC: 21.2 PG — LOW (ref 27–34)
MCHC RBC-ENTMCNC: 29.8 G/DL — LOW (ref 32–36)
MCV RBC AUTO: 71 FL — LOW (ref 80–100)
NRBC # BLD: 0 /100 WBCS — SIGNIFICANT CHANGE UP (ref 0–0)
PHOSPHATE SERPL-MCNC: 4.3 MG/DL — SIGNIFICANT CHANGE UP (ref 2.5–4.5)
PLATELET # BLD AUTO: 221 K/UL — SIGNIFICANT CHANGE UP (ref 150–400)
POTASSIUM SERPL-MCNC: 4 MMOL/L — SIGNIFICANT CHANGE UP (ref 3.5–5.3)
POTASSIUM SERPL-SCNC: 4 MMOL/L — SIGNIFICANT CHANGE UP (ref 3.5–5.3)
RBC # BLD: 3.97 M/UL — SIGNIFICANT CHANGE UP (ref 3.8–5.2)
RBC # FLD: 15.5 % — HIGH (ref 10.3–14.5)
SODIUM SERPL-SCNC: 140 MMOL/L — SIGNIFICANT CHANGE UP (ref 135–145)
WBC # BLD: 8.25 K/UL — SIGNIFICANT CHANGE UP (ref 3.8–10.5)
WBC # FLD AUTO: 8.25 K/UL — SIGNIFICANT CHANGE UP (ref 3.8–10.5)

## 2024-06-28 RX ORDER — MORPHINE SULFATE 100 MG/1
2 TABLET, EXTENDED RELEASE ORAL EVERY 4 HOURS
Refills: 0 | Status: DISCONTINUED | OUTPATIENT
Start: 2024-06-28 | End: 2024-06-29

## 2024-06-28 RX ORDER — MORPHINE SULFATE 100 MG/1
4 TABLET, EXTENDED RELEASE ORAL EVERY 4 HOURS
Refills: 0 | Status: DISCONTINUED | OUTPATIENT
Start: 2024-06-28 | End: 2024-06-29

## 2024-06-28 RX ORDER — ACETAMINOPHEN 325 MG
1000 TABLET ORAL ONCE
Refills: 0 | Status: COMPLETED | OUTPATIENT
Start: 2024-06-28 | End: 2024-06-28

## 2024-06-28 RX ADMIN — PANTOPRAZOLE SODIUM 40 MILLIGRAM(S): 40 INJECTION, POWDER, FOR SOLUTION INTRAVENOUS at 11:49

## 2024-06-28 RX ADMIN — MORPHINE SULFATE 2 MILLIGRAM(S): 100 TABLET, EXTENDED RELEASE ORAL at 17:06

## 2024-06-28 RX ADMIN — MORPHINE SULFATE 2 MILLIGRAM(S): 100 TABLET, EXTENDED RELEASE ORAL at 08:56

## 2024-06-28 RX ADMIN — MORPHINE SULFATE 2 MILLIGRAM(S): 100 TABLET, EXTENDED RELEASE ORAL at 18:06

## 2024-06-28 RX ADMIN — DEXTROSE MONOHYDRATE AND SODIUM CHLORIDE 75 MILLILITER(S): 5; .3 INJECTION, SOLUTION INTRAVENOUS at 14:02

## 2024-06-28 RX ADMIN — CLINDAMYCIN PHOSPHATE 100 MILLIGRAM(S): 150 INJECTION, SOLUTION INTRAVENOUS at 05:47

## 2024-06-28 RX ADMIN — CLINDAMYCIN PHOSPHATE 100 MILLIGRAM(S): 150 INJECTION, SOLUTION INTRAVENOUS at 21:07

## 2024-06-28 RX ADMIN — Medication 400 MILLIGRAM(S): at 11:49

## 2024-06-28 RX ADMIN — CLINDAMYCIN PHOSPHATE 100 MILLIGRAM(S): 150 INJECTION, SOLUTION INTRAVENOUS at 14:01

## 2024-06-28 RX ADMIN — MORPHINE SULFATE 2 MILLIGRAM(S): 100 TABLET, EXTENDED RELEASE ORAL at 09:56

## 2024-06-28 RX ADMIN — Medication 400 MILLIGRAM(S): at 18:28

## 2024-06-28 RX ADMIN — Medication 1000 MILLIGRAM(S): at 07:42

## 2024-06-28 RX ADMIN — Medication 1000 MILLIGRAM(S): at 00:17

## 2024-06-28 RX ADMIN — Medication 1000 MILLIGRAM(S): at 18:48

## 2024-06-28 RX ADMIN — Medication 1000 MILLIGRAM(S): at 12:45

## 2024-06-28 RX ADMIN — LOSARTAN POTASSIUM 25 MILLIGRAM(S): 100 TABLET, FILM COATED ORAL at 05:44

## 2024-06-28 RX ADMIN — HEPARIN SODIUM 5000 UNIT(S): 50 INJECTION, SOLUTION INTRAVENOUS at 05:45

## 2024-06-28 RX ADMIN — HEPARIN SODIUM 5000 UNIT(S): 50 INJECTION, SOLUTION INTRAVENOUS at 17:06

## 2024-06-28 RX ADMIN — Medication 400 MILLIGRAM(S): at 05:42

## 2024-06-29 ENCOUNTER — TRANSCRIPTION ENCOUNTER (OUTPATIENT)
Age: 69
End: 2024-06-29

## 2024-06-29 LAB
ANISOCYTOSIS BLD QL: SLIGHT — SIGNIFICANT CHANGE UP
GLUCOSE BLDC GLUCOMTR-MCNC: 128 MG/DL — HIGH (ref 70–99)
GLUCOSE BLDC GLUCOMTR-MCNC: 130 MG/DL — HIGH (ref 70–99)
GLUCOSE BLDC GLUCOMTR-MCNC: 180 MG/DL — HIGH (ref 70–99)
GLUCOSE BLDC GLUCOMTR-MCNC: 93 MG/DL — SIGNIFICANT CHANGE UP (ref 70–99)
GLUCOSE BLDC GLUCOMTR-MCNC: 94 MG/DL — SIGNIFICANT CHANGE UP (ref 70–99)
HCT VFR BLD CALC: 27.4 % — LOW (ref 34.5–45)
HGB BLD-MCNC: 8.5 G/DL — LOW (ref 11.5–15.5)
MANUAL SMEAR VERIFICATION: SIGNIFICANT CHANGE UP
MCHC RBC-ENTMCNC: 21.5 PG — LOW (ref 27–34)
MCHC RBC-ENTMCNC: 31 G/DL — LOW (ref 32–36)
MCV RBC AUTO: 69.2 FL — LOW (ref 80–100)
MICROCYTES BLD QL: SLIGHT — SIGNIFICANT CHANGE UP
NRBC # BLD: 0 /100 WBCS — SIGNIFICANT CHANGE UP (ref 0–0)
OVALOCYTES BLD QL SMEAR: SLIGHT — SIGNIFICANT CHANGE UP
PLAT MORPH BLD: NORMAL — SIGNIFICANT CHANGE UP
PLATELET # BLD AUTO: 202 K/UL — SIGNIFICANT CHANGE UP (ref 150–400)
POIKILOCYTOSIS BLD QL AUTO: SLIGHT — SIGNIFICANT CHANGE UP
RBC # BLD: 3.96 M/UL — SIGNIFICANT CHANGE UP (ref 3.8–5.2)
RBC # FLD: 15.9 % — HIGH (ref 10.3–14.5)
RBC BLD AUTO: ABNORMAL
WBC # BLD: 13.17 K/UL — HIGH (ref 3.8–10.5)
WBC # FLD AUTO: 13.17 K/UL — HIGH (ref 3.8–10.5)

## 2024-06-29 RX ORDER — CLINDAMYCIN PHOSPHATE 150 MG/ML
2 INJECTION, SOLUTION INTRAVENOUS
Qty: 12 | Refills: 0
Start: 2024-06-29 | End: 2024-06-30

## 2024-06-29 RX ORDER — ACETAMINOPHEN 325 MG
2 TABLET ORAL
Qty: 0 | Refills: 0 | DISCHARGE

## 2024-06-29 RX ORDER — OXYCODONE HYDROCHLORIDE 100 MG/5ML
1 SOLUTION ORAL
Qty: 12 | Refills: 0
Start: 2024-06-29 | End: 2024-07-01

## 2024-06-29 RX ORDER — ACETAMINOPHEN 325 MG
650 TABLET ORAL EVERY 6 HOURS
Refills: 0 | Status: DISCONTINUED | OUTPATIENT
Start: 2024-06-29 | End: 2024-06-30

## 2024-06-29 RX ORDER — OXYCODONE HYDROCHLORIDE 100 MG/5ML
5 SOLUTION ORAL EVERY 4 HOURS
Refills: 0 | Status: DISCONTINUED | OUTPATIENT
Start: 2024-06-29 | End: 2024-06-30

## 2024-06-29 RX ADMIN — HEPARIN SODIUM 5000 UNIT(S): 50 INJECTION, SOLUTION INTRAVENOUS at 05:52

## 2024-06-29 RX ADMIN — Medication 650 MILLIGRAM(S): at 18:20

## 2024-06-29 RX ADMIN — CLINDAMYCIN PHOSPHATE 100 MILLIGRAM(S): 150 INJECTION, SOLUTION INTRAVENOUS at 05:49

## 2024-06-29 RX ADMIN — DEXTROSE MONOHYDRATE AND SODIUM CHLORIDE 75 MILLILITER(S): 5; .3 INJECTION, SOLUTION INTRAVENOUS at 05:48

## 2024-06-29 RX ADMIN — INSULIN LISPRO 1: 100 INJECTION, SOLUTION SUBCUTANEOUS at 21:23

## 2024-06-29 RX ADMIN — HEPARIN SODIUM 5000 UNIT(S): 50 INJECTION, SOLUTION INTRAVENOUS at 17:56

## 2024-06-29 RX ADMIN — CLINDAMYCIN PHOSPHATE 100 MILLIGRAM(S): 150 INJECTION, SOLUTION INTRAVENOUS at 21:22

## 2024-06-29 RX ADMIN — MORPHINE SULFATE 2 MILLIGRAM(S): 100 TABLET, EXTENDED RELEASE ORAL at 01:46

## 2024-06-29 RX ADMIN — LOSARTAN POTASSIUM 25 MILLIGRAM(S): 100 TABLET, FILM COATED ORAL at 05:51

## 2024-06-29 RX ADMIN — CLINDAMYCIN PHOSPHATE 100 MILLIGRAM(S): 150 INJECTION, SOLUTION INTRAVENOUS at 14:40

## 2024-06-29 RX ADMIN — MORPHINE SULFATE 2 MILLIGRAM(S): 100 TABLET, EXTENDED RELEASE ORAL at 02:22

## 2024-06-29 RX ADMIN — PANTOPRAZOLE SODIUM 40 MILLIGRAM(S): 40 INJECTION, POWDER, FOR SOLUTION INTRAVENOUS at 14:40

## 2024-06-29 RX ADMIN — Medication 650 MILLIGRAM(S): at 19:20

## 2024-06-30 VITALS
HEART RATE: 99 BPM | OXYGEN SATURATION: 98 % | TEMPERATURE: 100 F | SYSTOLIC BLOOD PRESSURE: 146 MMHG | RESPIRATION RATE: 18 BRPM | DIASTOLIC BLOOD PRESSURE: 89 MMHG

## 2024-06-30 LAB
ANION GAP SERPL CALC-SCNC: 6 MMOL/L — SIGNIFICANT CHANGE UP (ref 5–17)
APPEARANCE UR: CLEAR — SIGNIFICANT CHANGE UP
BACTERIA # UR AUTO: ABNORMAL /HPF
BILIRUB UR-MCNC: NEGATIVE — SIGNIFICANT CHANGE UP
BUN SERPL-MCNC: 7 MG/DL — SIGNIFICANT CHANGE UP (ref 7–23)
CALCIUM SERPL-MCNC: 8.5 MG/DL — SIGNIFICANT CHANGE UP (ref 8.5–10.1)
CHLORIDE SERPL-SCNC: 107 MMOL/L — SIGNIFICANT CHANGE UP (ref 96–108)
CO2 SERPL-SCNC: 25 MMOL/L — SIGNIFICANT CHANGE UP (ref 22–31)
COLOR SPEC: ABNORMAL
CREAT SERPL-MCNC: 0.82 MG/DL — SIGNIFICANT CHANGE UP (ref 0.5–1.3)
DIFF PNL FLD: ABNORMAL
EGFR: 77 ML/MIN/1.73M2 — SIGNIFICANT CHANGE UP
EPI CELLS # UR: PRESENT
GLUCOSE BLDC GLUCOMTR-MCNC: 118 MG/DL — HIGH (ref 70–99)
GLUCOSE BLDC GLUCOMTR-MCNC: 121 MG/DL — HIGH (ref 70–99)
GLUCOSE BLDC GLUCOMTR-MCNC: 98 MG/DL — SIGNIFICANT CHANGE UP (ref 70–99)
GLUCOSE SERPL-MCNC: 146 MG/DL — HIGH (ref 70–99)
GLUCOSE UR QL: NEGATIVE MG/DL — SIGNIFICANT CHANGE UP
HCT VFR BLD CALC: 27.3 % — LOW (ref 34.5–45)
HGB BLD-MCNC: 8.4 G/DL — LOW (ref 11.5–15.5)
KETONES UR-MCNC: ABNORMAL MG/DL
LEUKOCYTE ESTERASE UR-ACNC: NEGATIVE — SIGNIFICANT CHANGE UP
MCHC RBC-ENTMCNC: 21.3 PG — LOW (ref 27–34)
MCHC RBC-ENTMCNC: 30.8 G/DL — LOW (ref 32–36)
MCV RBC AUTO: 69.1 FL — LOW (ref 80–100)
NITRITE UR-MCNC: NEGATIVE — SIGNIFICANT CHANGE UP
NRBC # BLD: 0 /100 WBCS — SIGNIFICANT CHANGE UP (ref 0–0)
PH UR: 7.5 — SIGNIFICANT CHANGE UP (ref 5–8)
PLATELET # BLD AUTO: 211 K/UL — SIGNIFICANT CHANGE UP (ref 150–400)
POTASSIUM SERPL-MCNC: 3.4 MMOL/L — LOW (ref 3.5–5.3)
POTASSIUM SERPL-SCNC: 3.4 MMOL/L — LOW (ref 3.5–5.3)
PROT UR-MCNC: 100 MG/DL
RBC # BLD: 3.95 M/UL — SIGNIFICANT CHANGE UP (ref 3.8–5.2)
RBC # FLD: 15.7 % — HIGH (ref 10.3–14.5)
RBC CASTS # UR COMP ASSIST: ABNORMAL /HPF
SODIUM SERPL-SCNC: 138 MMOL/L — SIGNIFICANT CHANGE UP (ref 135–145)
SP GR SPEC: 1.01 — SIGNIFICANT CHANGE UP (ref 1–1.03)
UROBILINOGEN FLD QL: 1 MG/DL — SIGNIFICANT CHANGE UP (ref 0.2–1)
WBC # BLD: 11.33 K/UL — HIGH (ref 3.8–10.5)
WBC # FLD AUTO: 11.33 K/UL — HIGH (ref 3.8–10.5)
WBC UR QL: 3 /HPF — SIGNIFICANT CHANGE UP (ref 0–5)

## 2024-06-30 RX ADMIN — PANTOPRAZOLE SODIUM 40 MILLIGRAM(S): 40 INJECTION, POWDER, FOR SOLUTION INTRAVENOUS at 11:16

## 2024-06-30 RX ADMIN — CLINDAMYCIN PHOSPHATE 100 MILLIGRAM(S): 150 INJECTION, SOLUTION INTRAVENOUS at 06:17

## 2024-06-30 RX ADMIN — HEPARIN SODIUM 5000 UNIT(S): 50 INJECTION, SOLUTION INTRAVENOUS at 06:17

## 2024-06-30 RX ADMIN — LOSARTAN POTASSIUM 25 MILLIGRAM(S): 100 TABLET, FILM COATED ORAL at 06:11

## 2024-06-30 RX ADMIN — CLINDAMYCIN PHOSPHATE 100 MILLIGRAM(S): 150 INJECTION, SOLUTION INTRAVENOUS at 13:18

## 2024-07-05 LAB — SURGICAL PATHOLOGY STUDY: SIGNIFICANT CHANGE UP

## 2024-07-10 DIAGNOSIS — Z87.891 PERSONAL HISTORY OF NICOTINE DEPENDENCE: ICD-10-CM

## 2024-07-10 DIAGNOSIS — Z79.899 OTHER LONG TERM (CURRENT) DRUG THERAPY: ICD-10-CM

## 2024-07-10 DIAGNOSIS — E78.5 HYPERLIPIDEMIA, UNSPECIFIED: ICD-10-CM

## 2024-07-10 DIAGNOSIS — C64.1 MALIGNANT NEOPLASM OF RIGHT KIDNEY, EXCEPT RENAL PELVIS: ICD-10-CM

## 2024-07-10 DIAGNOSIS — Z79.84 LONG TERM (CURRENT) USE OF ORAL HYPOGLYCEMIC DRUGS: ICD-10-CM

## 2024-07-10 DIAGNOSIS — N28.89 OTHER SPECIFIED DISORDERS OF KIDNEY AND URETER: ICD-10-CM

## 2024-07-10 DIAGNOSIS — Z88.0 ALLERGY STATUS TO PENICILLIN: ICD-10-CM

## 2024-07-10 DIAGNOSIS — E11.9 TYPE 2 DIABETES MELLITUS WITHOUT COMPLICATIONS: ICD-10-CM

## 2024-07-10 DIAGNOSIS — I10 ESSENTIAL (PRIMARY) HYPERTENSION: ICD-10-CM

## 2024-07-10 DIAGNOSIS — Z91.018 ALLERGY TO OTHER FOODS: ICD-10-CM

## 2024-07-15 ENCOUNTER — APPOINTMENT (OUTPATIENT)
Dept: UROLOGY | Facility: CLINIC | Age: 69
End: 2024-07-15
Payer: COMMERCIAL

## 2024-07-15 DIAGNOSIS — Z90.5 ACQUIRED ABSENCE OF KIDNEY: ICD-10-CM

## 2024-07-15 PROCEDURE — 99213 OFFICE O/P EST LOW 20 MIN: CPT | Mod: 24

## 2024-07-16 LAB
ALBUMIN SERPL ELPH-MCNC: 3.9 G/DL
ANION GAP SERPL CALC-SCNC: 12 MMOL/L
BUN SERPL-MCNC: 11 MG/DL
CALCIUM SERPL-MCNC: 9.2 MG/DL
CHLORIDE SERPL-SCNC: 106 MMOL/L
CO2 SERPL-SCNC: 25 MMOL/L
CREAT SERPL-MCNC: 0.92 MG/DL
EGFR: 67 ML/MIN/1.73M2
GLUCOSE SERPL-MCNC: 151 MG/DL
PHOSPHATE SERPL-MCNC: 3.4 MG/DL
POTASSIUM SERPL-SCNC: 4.7 MMOL/L
SODIUM SERPL-SCNC: 143 MMOL/L

## 2025-01-14 ENCOUNTER — OUTPATIENT (OUTPATIENT)
Dept: OUTPATIENT SERVICES | Facility: HOSPITAL | Age: 70
LOS: 1 days | End: 2025-01-14
Payer: COMMERCIAL

## 2025-01-14 ENCOUNTER — APPOINTMENT (OUTPATIENT)
Dept: CT IMAGING | Facility: IMAGING CENTER | Age: 70
End: 2025-01-14

## 2025-01-14 DIAGNOSIS — Z98.891 HISTORY OF UTERINE SCAR FROM PREVIOUS SURGERY: Chronic | ICD-10-CM

## 2025-01-14 DIAGNOSIS — D49.519 NEOPLASM OF UNSPECIFIED BEHAVIOR OF UNSPECIFIED KIDNEY: ICD-10-CM

## 2025-01-14 DIAGNOSIS — Z90.5 ACQUIRED ABSENCE OF KIDNEY: ICD-10-CM

## 2025-01-14 DIAGNOSIS — Z98.890 OTHER SPECIFIED POSTPROCEDURAL STATES: Chronic | ICD-10-CM

## 2025-01-14 PROCEDURE — 74178 CT ABD&PLV WO CNTR FLWD CNTR: CPT

## 2025-01-14 PROCEDURE — 74178 CT ABD&PLV WO CNTR FLWD CNTR: CPT | Mod: 26

## 2025-01-28 ENCOUNTER — NON-APPOINTMENT (OUTPATIENT)
Age: 70
End: 2025-01-28

## 2025-01-28 ENCOUNTER — APPOINTMENT (OUTPATIENT)
Dept: UROLOGY | Facility: CLINIC | Age: 70
End: 2025-01-28
Payer: COMMERCIAL

## 2025-01-28 VITALS
TEMPERATURE: 98.6 F | SYSTOLIC BLOOD PRESSURE: 135 MMHG | OXYGEN SATURATION: 97 % | DIASTOLIC BLOOD PRESSURE: 68 MMHG | WEIGHT: 168 LBS | HEART RATE: 78 BPM | BODY MASS INDEX: 26.31 KG/M2

## 2025-01-28 DIAGNOSIS — Z90.5 ACQUIRED ABSENCE OF KIDNEY: ICD-10-CM

## 2025-01-28 PROCEDURE — 99213 OFFICE O/P EST LOW 20 MIN: CPT

## 2025-01-30 LAB
ALBUMIN SERPL ELPH-MCNC: 4.3 G/DL
ANION GAP SERPL CALC-SCNC: 15 MMOL/L
APPEARANCE: CLEAR
BACTERIA UR CULT: NORMAL
BACTERIA: ABNORMAL /HPF
BILIRUBIN URINE: NEGATIVE
BLOOD URINE: NEGATIVE
BUN SERPL-MCNC: 11 MG/DL
CALCIUM SERPL-MCNC: 9.8 MG/DL
CAST: 0 /LPF
CHLORIDE SERPL-SCNC: 106 MMOL/L
CO2 SERPL-SCNC: 24 MMOL/L
COLOR: YELLOW
CREAT SERPL-MCNC: 1.02 MG/DL
EGFR: 59 ML/MIN/1.73M2
EPITHELIAL CELLS: 5 /HPF
GLUCOSE QUALITATIVE U: NEGATIVE MG/DL
GLUCOSE SERPL-MCNC: 173 MG/DL
KETONES URINE: NEGATIVE MG/DL
LEUKOCYTE ESTERASE URINE: ABNORMAL
MICROSCOPIC-UA: NORMAL
NITRITE URINE: NEGATIVE
PH URINE: 6.5
PHOSPHATE SERPL-MCNC: 3.5 MG/DL
POTASSIUM SERPL-SCNC: 4.2 MMOL/L
PROTEIN URINE: NORMAL MG/DL
RED BLOOD CELLS URINE: 2 /HPF
SODIUM SERPL-SCNC: 145 MMOL/L
SPECIFIC GRAVITY URINE: 1.03
UROBILINOGEN URINE: 1 MG/DL
WHITE BLOOD CELLS URINE: 2 /HPF

## 2025-04-04 NOTE — ED PROVIDER NOTE - NS ED ROS FT
General: Denies fever, chills  HEENT: Denies sensory changes, sore throat  Neck: Denies neck pain, neck stiffness  Resp: Denies coughing, SOB  Cardiovascular: Denies CP, palpitations, LE edema  GI: Denies nausea, vomiting, abdominal pain, diarrhea, constipation, blood in stool  : Denies dysuria, hematuria, frequency, incontinence  MSK: Denies back pain  Neuro: Denies HA, dizziness, numbness, weakness  Skin: Denies rashes.
Never

## 2025-06-24 PROBLEM — Z88.9 ALLERGY, DRUG: Status: ACTIVE | Noted: 2025-06-24

## 2025-07-01 RX ORDER — METHYLPREDNISOLONE 32 MG/1
32 TABLET ORAL
Qty: 2 | Refills: 0 | Status: ACTIVE | COMMUNITY
Start: 2025-06-24 | End: 1900-01-01

## 2025-07-05 ENCOUNTER — OUTPATIENT (OUTPATIENT)
Dept: OUTPATIENT SERVICES | Facility: HOSPITAL | Age: 70
LOS: 1 days | End: 2025-07-05
Payer: COMMERCIAL

## 2025-07-05 ENCOUNTER — APPOINTMENT (OUTPATIENT)
Dept: CT IMAGING | Facility: IMAGING CENTER | Age: 70
End: 2025-07-05
Payer: COMMERCIAL

## 2025-07-05 DIAGNOSIS — Z00.8 ENCOUNTER FOR OTHER GENERAL EXAMINATION: ICD-10-CM

## 2025-07-05 DIAGNOSIS — Z90.5 ACQUIRED ABSENCE OF KIDNEY: ICD-10-CM

## 2025-07-05 DIAGNOSIS — Z98.891 HISTORY OF UTERINE SCAR FROM PREVIOUS SURGERY: Chronic | ICD-10-CM

## 2025-07-05 DIAGNOSIS — Z98.890 OTHER SPECIFIED POSTPROCEDURAL STATES: Chronic | ICD-10-CM

## 2025-07-05 PROCEDURE — 74178 CT ABD&PLV WO CNTR FLWD CNTR: CPT | Mod: 26

## 2025-07-05 PROCEDURE — 74178 CT ABD&PLV WO CNTR FLWD CNTR: CPT

## 2025-07-29 ENCOUNTER — APPOINTMENT (OUTPATIENT)
Dept: UROLOGY | Facility: CLINIC | Age: 70
End: 2025-07-29

## 2025-08-20 ENCOUNTER — APPOINTMENT (OUTPATIENT)
Dept: UROLOGY | Facility: CLINIC | Age: 70
End: 2025-08-20
Payer: COMMERCIAL

## 2025-08-20 VITALS
OXYGEN SATURATION: 98 % | BODY MASS INDEX: 28.25 KG/M2 | HEIGHT: 67 IN | SYSTOLIC BLOOD PRESSURE: 133 MMHG | HEART RATE: 92 BPM | TEMPERATURE: 97.7 F | WEIGHT: 180 LBS | DIASTOLIC BLOOD PRESSURE: 69 MMHG

## 2025-08-20 DIAGNOSIS — D49.519 NEOPLASM OF UNSPECIFIED BEHAVIOR OF UNSPECIFIED KIDNEY: ICD-10-CM

## 2025-08-20 DIAGNOSIS — Z90.5 ACQUIRED ABSENCE OF KIDNEY: ICD-10-CM

## 2025-08-20 PROCEDURE — 99214 OFFICE O/P EST MOD 30 MIN: CPT

## 2025-08-21 LAB
APPEARANCE: ABNORMAL
BACTERIA: ABNORMAL /HPF
BILIRUBIN URINE: NEGATIVE
BLOOD URINE: NEGATIVE
CALCIUM OXALATE CRYSTALS: PRESENT
CAST: 6 /LPF
COLOR: NORMAL
EPITHELIAL CELLS: 15 /HPF
GLUCOSE QUALITATIVE U: NEGATIVE MG/DL
KETONES URINE: ABNORMAL MG/DL
LEUKOCYTE ESTERASE URINE: ABNORMAL
MICROSCOPIC-UA: NORMAL
NITRITE URINE: NEGATIVE
PH URINE: 5.5
PROTEIN URINE: NORMAL MG/DL
RED BLOOD CELLS URINE: 1 /HPF
REVIEW: NORMAL
SPECIFIC GRAVITY URINE: 1.03
UROBILINOGEN URINE: 0.2 MG/DL
WHITE BLOOD CELLS URINE: 7 /HPF

## 2025-08-26 LAB
ALBUMIN SERPL ELPH-MCNC: 4.4 G/DL
ALP BLD-CCNC: 64 U/L
ALT SERPL-CCNC: 14 U/L
ANION GAP SERPL CALC-SCNC: 13 MMOL/L
AST SERPL-CCNC: 16 U/L
BILIRUB SERPL-MCNC: 0.4 MG/DL
BUN SERPL-MCNC: 13 MG/DL
CALCIUM SERPL-MCNC: 9.4 MG/DL
CHLORIDE SERPL-SCNC: 105 MMOL/L
CO2 SERPL-SCNC: 24 MMOL/L
CREAT SERPL-MCNC: 0.97 MG/DL
EGFRCR SERPLBLD CKD-EPI 2021: 62 ML/MIN/1.73M2
GLUCOSE SERPL-MCNC: 73 MG/DL
HCT VFR BLD CALC: 42.3 %
HGB BLD-MCNC: 12.7 G/DL
MCHC RBC-ENTMCNC: 24.1 PG
MCHC RBC-ENTMCNC: 30 G/DL
MCV RBC AUTO: 80.4 FL
PLATELET # BLD AUTO: 244 K/UL
POTASSIUM SERPL-SCNC: 4.6 MMOL/L
PROT SERPL-MCNC: 7.5 G/DL
RBC # BLD: 5.26 M/UL
RBC # FLD: 15.4 %
SODIUM SERPL-SCNC: 143 MMOL/L
WBC # FLD AUTO: 7.96 K/UL

## (undated) DEVICE — PREP CHLORAPREP HI-LITE ORANGE 26ML

## (undated) DEVICE — POSITIONER FOAM HEAD CRADLE (PINK)

## (undated) DEVICE — XI ARM FORCEP FENESTRATED BIPOLAR 8MM

## (undated) DEVICE — XI ARM SCISSOR MONO CURVED

## (undated) DEVICE — ELCTR REM POLYHESIVE ADULT PT RETURN 15FT

## (undated) DEVICE — GLV 7 PROTEXIS (WHITE)

## (undated) DEVICE — XI DRAPE ARM

## (undated) DEVICE — POSITIONER FOAM EGG CRATE ULNAR 2PCS (PINK)

## (undated) DEVICE — DRAPE 3/4 SHEET W REINFORCEMENT 56X77"

## (undated) DEVICE — XI ARM CLIP APPLIER MEDIUM-LARGE

## (undated) DEVICE — KIT ENDO PROCEDURE CUST W/VLV

## (undated) DEVICE — ELCTR GROUNDING PAD ADULT COVIDIEN

## (undated) DEVICE — XI SEAL UNIV 5- 8 MM

## (undated) DEVICE — TUBING STRYKEFLOW II SUCTION / IRRIGATOR

## (undated) DEVICE — SUT VLOC 180 2-0 6" GS-22 GREEN

## (undated) DEVICE — XI DRAPE COLUMN

## (undated) DEVICE — TROCAR SURGIQUEST AIRSEAL 12MMX100MM

## (undated) DEVICE — SOL IRR POUR NS 0.9% 500ML

## (undated) DEVICE — SUT VICRYL 0 27" UR-6

## (undated) DEVICE — Device

## (undated) DEVICE — GLV 7.5 PROTEXIS (WHITE)

## (undated) DEVICE — DRAPE SPLIT SHEET 77" X 120"

## (undated) DEVICE — NDL HYPO SAFE 25G X 1.5" (ORANGE)

## (undated) DEVICE — FOLEY TRAY 16FR 5CC LF UMETER CLOSED

## (undated) DEVICE — SUT MONOCRYL 4-0 27" PS-2 UNDYED

## (undated) DEVICE — ADAPTER ENDO CHNL SINGLE USE

## (undated) DEVICE — PROBE FIAPC CIRC O.D. 2.3MM/6.9FR LNTH 220CM/7.2FT

## (undated) DEVICE — XI TIP COVER

## (undated) DEVICE — XI ARM FORCEP MARYLAND BIPOLAR

## (undated) DEVICE — VENODYNE/SCD SLEEVE CALF MEDIUM

## (undated) DEVICE — WARMING BLANKET UPPER ADULT

## (undated) DEVICE — FORCEP RADIAL JAW 4 W NDL 2.4MM 2.8MM 240CM ORANGE DISP

## (undated) DEVICE — SOL IRR POUR H2O 250ML

## (undated) DEVICE — TUBING HYBRID CO2

## (undated) DEVICE — PROBE FIAPC DIA 2.3MM/7FR LNTH 220CM/7.2FT

## (undated) DEVICE — TUBING ERBE CO2 OLYMPUS CONNECTOR

## (undated) DEVICE — XI OBTURATOR OPTICAL BLADELESS 8MM

## (undated) DEVICE — BLADE SCALPEL SAFETYLOCK #15

## (undated) DEVICE — SYR LUER LOK 10CC

## (undated) DEVICE — TUBING AIRSEAL TRI-LUMEN FILTERED

## (undated) DEVICE — D HELP - CLEARVIEW CLEARIFY SYSTEM